# Patient Record
Sex: MALE | Race: BLACK OR AFRICAN AMERICAN | NOT HISPANIC OR LATINO | Employment: OTHER | URBAN - METROPOLITAN AREA
[De-identification: names, ages, dates, MRNs, and addresses within clinical notes are randomized per-mention and may not be internally consistent; named-entity substitution may affect disease eponyms.]

---

## 2017-09-06 ENCOUNTER — OFFICE VISIT (OUTPATIENT)
Dept: FAMILY MEDICINE | Facility: CLINIC | Age: 47
End: 2017-09-06
Payer: MEDICARE

## 2017-09-06 VITALS
WEIGHT: 315 LBS | OXYGEN SATURATION: 97 % | BODY MASS INDEX: 45.1 KG/M2 | SYSTOLIC BLOOD PRESSURE: 156 MMHG | HEART RATE: 84 BPM | HEIGHT: 70 IN | TEMPERATURE: 98 F | DIASTOLIC BLOOD PRESSURE: 90 MMHG

## 2017-09-06 DIAGNOSIS — I87.2 VENOUS INSUFFICIENCY: Primary | ICD-10-CM

## 2017-09-06 DIAGNOSIS — K21.9 GASTROESOPHAGEAL REFLUX DISEASE, ESOPHAGITIS PRESENCE NOT SPECIFIED: ICD-10-CM

## 2017-09-06 DIAGNOSIS — B35.1 ONYCHOMYCOSIS: ICD-10-CM

## 2017-09-06 DIAGNOSIS — E66.01 MORBID OBESITY WITH BMI OF 60.0-69.9, ADULT: ICD-10-CM

## 2017-09-06 PROCEDURE — 3077F SYST BP >= 140 MM HG: CPT | Mod: S$GLB,,, | Performed by: FAMILY MEDICINE

## 2017-09-06 PROCEDURE — 99999 PR PBB SHADOW E&M-EST. PATIENT-LVL III: CPT | Mod: PBBFAC,,, | Performed by: FAMILY MEDICINE

## 2017-09-06 PROCEDURE — 3008F BODY MASS INDEX DOCD: CPT | Mod: S$GLB,,, | Performed by: FAMILY MEDICINE

## 2017-09-06 PROCEDURE — 99214 OFFICE O/P EST MOD 30 MIN: CPT | Mod: S$GLB,,, | Performed by: FAMILY MEDICINE

## 2017-09-06 PROCEDURE — 99499 UNLISTED E&M SERVICE: CPT | Mod: S$GLB,,, | Performed by: FAMILY MEDICINE

## 2017-09-06 PROCEDURE — 3080F DIAST BP >= 90 MM HG: CPT | Mod: S$GLB,,, | Performed by: FAMILY MEDICINE

## 2017-09-06 RX ORDER — PHENOL/SODIUM PHENOLATE
1 AEROSOL, SPRAY (ML) MUCOUS MEMBRANE DAILY PRN
Qty: 30 EACH | Refills: 0 | Status: SHIPPED | OUTPATIENT
Start: 2017-09-06 | End: 2017-10-04 | Stop reason: SDUPTHER

## 2017-09-06 RX ORDER — FUROSEMIDE 20 MG/1
20 TABLET ORAL DAILY PRN
Qty: 30 TABLET | Refills: 3 | Status: SHIPPED | OUTPATIENT
Start: 2017-09-06 | End: 2017-10-04 | Stop reason: SDUPTHER

## 2017-09-06 NOTE — PROGRESS NOTES
"[facility]  Progress Note    SUBJECTIVE:     Chief Complaint   Patient presents with    Leg Swelling       HPI   Jose Manuel Jacome  is a 46 y.o. male here for c/o lower leg swelling with the R worst than L. No leg pain but standing/walking makes the swelling worst. Hasn't tried anything for it. Onset was "long time ago". No calf tenderness, able to ambulate without problems.    Review of patient's allergies indicates:   Allergen Reactions    Tramadol      Nausea, abdominal cramps    Allopurinol analogues Other (See Comments)     syncope       Past Medical History:   Diagnosis Date    Gastroesophageal reflux disease 9/6/2017    Gout     Morbid obesity     Sleep apnea      Past Surgical History:   Procedure Laterality Date    HIP FRACTURE SURGERY      left hip fracture    WRIST SURGERY      left wrist surgery     Family History   Problem Relation Age of Onset    Heart disease Mother     Cancer Father      Social History   Substance Use Topics    Smoking status: Current Every Day Smoker     Packs/day: 0.50     Years: 30.00     Types: Cigarettes    Smokeless tobacco: Never Used    Alcohol use No        Review of Systems   Constitutional: Negative for chills, fever and malaise/fatigue.   HENT: Negative.    Respiratory: Negative.  Negative for cough and shortness of breath.    Cardiovascular: Positive for leg swelling. Negative for chest pain.   Gastrointestinal: Positive for heartburn. Negative for abdominal pain, nausea and vomiting.   Genitourinary: Negative.    Neurological: Negative for weakness and headaches.   All other systems reviewed and are negative.    OBJECTIVE:     Vitals:    09/06/17 1045   BP: (!) 156/90   Pulse: 84   Temp: 98.2 °F (36.8 °C)     Body mass index is 63.2 kg/m².    Physical Exam   Constitutional: He is oriented to person, place, and time and well-developed, well-nourished, and in no distress. No distress.   HENT:   Head: Normocephalic and atraumatic.   Nose: Nose normal. "   Eyes: Conjunctivae and EOM are normal.   Cardiovascular: Normal rate, regular rhythm and normal heart sounds.  Exam reveals no gallop and no friction rub.    No murmur heard.  Pulmonary/Chest: Effort normal and breath sounds normal. No respiratory distress. He has no wheezes. He has no rales. He exhibits no tenderness.   Abdominal: Soft. Bowel sounds are normal. He exhibits no distension. There is no tenderness. There is no rebound.   Musculoskeletal: He exhibits edema (2+ pitting edema, with darkening skin color changes w/ dry skin of RLE. no ulcers, erythema, of evidence of infection).   Neurological: He is alert and oriented to person, place, and time.   Skin: Skin is warm. He is not diaphoretic.       Old records were reviewed. Labs and/or images were independently reviewed.    ASSESSMENT     1. Venous insufficiency    2. Morbid obesity with BMI of 60.0-69.9, adult    3. Gastroesophageal reflux disease, esophagitis presence not specified    4. Onychomycosis        PLAN:     Venous insufficiency  -     Start furosemide (LASIX) 20 MG tablet; Take 1 tablet (20 mg total) by mouth daily as needed.  Dispense: 30 tablet; Refill: 3  -     Recommend ICE, and elevation to decrease leg swelling.    Morbid obesity with BMI of 60.0-69.9, adult   -     Counseled patient about healthy diet, exercise habits, and to increase physical activity.   -     Will cut out hawaiian punch and all sugary drinks.    Gastroesophageal reflux disease, esophagitis presence not specified  -     Start omeprazole 20 mg TbEC; Take 1 tablet by mouth daily as needed.  Dispense: 30 each; Refill: 0    Onychomycosis   -    Will not treat at this time. Needs labs drawn first, and fix venous swelling first.     RTC PRN    Emerson Houser MD  09/06/2017 11:33 AM

## 2017-10-04 ENCOUNTER — OFFICE VISIT (OUTPATIENT)
Dept: FAMILY MEDICINE | Facility: CLINIC | Age: 47
End: 2017-10-04
Payer: MEDICARE

## 2017-10-04 VITALS
WEIGHT: 315 LBS | TEMPERATURE: 98 F | BODY MASS INDEX: 45.1 KG/M2 | OXYGEN SATURATION: 96 % | SYSTOLIC BLOOD PRESSURE: 140 MMHG | HEIGHT: 70 IN | DIASTOLIC BLOOD PRESSURE: 88 MMHG | HEART RATE: 78 BPM

## 2017-10-04 DIAGNOSIS — E66.01 MORBID OBESITY WITH BMI OF 60.0-69.9, ADULT: ICD-10-CM

## 2017-10-04 DIAGNOSIS — K21.9 GASTROESOPHAGEAL REFLUX DISEASE, ESOPHAGITIS PRESENCE NOT SPECIFIED: ICD-10-CM

## 2017-10-04 DIAGNOSIS — I87.2 VENOUS INSUFFICIENCY: Primary | ICD-10-CM

## 2017-10-04 DIAGNOSIS — Z23 PNEUMOCOCCAL VACCINATION ADMINISTERED AT CURRENT VISIT: ICD-10-CM

## 2017-10-04 PROCEDURE — 99499 UNLISTED E&M SERVICE: CPT | Mod: S$GLB,,, | Performed by: FAMILY MEDICINE

## 2017-10-04 PROCEDURE — 99999 PR PBB SHADOW E&M-EST. PATIENT-LVL III: CPT | Mod: PBBFAC,,, | Performed by: FAMILY MEDICINE

## 2017-10-04 PROCEDURE — 90732 PPSV23 VACC 2 YRS+ SUBQ/IM: CPT | Mod: S$GLB,,, | Performed by: FAMILY MEDICINE

## 2017-10-04 PROCEDURE — G0009 ADMIN PNEUMOCOCCAL VACCINE: HCPCS | Mod: S$GLB,,, | Performed by: FAMILY MEDICINE

## 2017-10-04 PROCEDURE — 99214 OFFICE O/P EST MOD 30 MIN: CPT | Mod: S$GLB,,, | Performed by: FAMILY MEDICINE

## 2017-10-04 RX ORDER — FUROSEMIDE 20 MG/1
20 TABLET ORAL DAILY
Qty: 90 TABLET | Refills: 3 | Status: SHIPPED | OUTPATIENT
Start: 2017-10-04 | End: 2019-03-22

## 2017-10-04 RX ORDER — PHENOL/SODIUM PHENOLATE
1 AEROSOL, SPRAY (ML) MUCOUS MEMBRANE DAILY PRN
Qty: 30 EACH | Refills: 3 | Status: SHIPPED | OUTPATIENT
Start: 2017-10-04 | End: 2017-12-24 | Stop reason: SDUPTHER

## 2017-10-04 RX ORDER — OMEPRAZOLE 20 MG/1
CAPSULE, DELAYED RELEASE ORAL DAILY PRN
Refills: 0 | COMMUNITY
Start: 2017-09-06 | End: 2017-10-04 | Stop reason: SDUPTHER

## 2017-10-04 NOTE — PROGRESS NOTES
[facility]  Progress Note    SUBJECTIVE:     Chief Complaint   Patient presents with    Gout    Leg Swelling       HPI   Jose Manuel Jacome  is a 46 y.o. male here for c/o leg swelling. His swelling did improve with lasix but he wouldn't take it every day, but when he did would take 2-3 at a time. The pain in his lower legs has improved. He admits still drinking a lot of coffee, with sugar. Lost 11 lbs since last office visit.    Review of patient's allergies indicates:   Allergen Reactions    Tramadol      Nausea, abdominal cramps    Allopurinol analogues Other (See Comments)     syncope       Past Medical History:   Diagnosis Date    Gastroesophageal reflux disease 9/6/2017    Gout     Morbid obesity     Sleep apnea      Past Surgical History:   Procedure Laterality Date    HIP FRACTURE SURGERY      left hip fracture    WRIST SURGERY      left wrist surgery     Family History   Problem Relation Age of Onset    Heart disease Mother     Cancer Father      Social History   Substance Use Topics    Smoking status: Current Every Day Smoker     Packs/day: 0.50     Years: 30.00     Types: Cigarettes    Smokeless tobacco: Never Used    Alcohol use No        Review of Systems   Constitutional: Negative for chills, fever and malaise/fatigue.   HENT: Negative.    Respiratory: Negative.  Negative for cough and shortness of breath.    Cardiovascular: Positive for leg swelling. Negative for chest pain.   Gastrointestinal: Positive for heartburn. Negative for abdominal pain, nausea and vomiting.   Genitourinary: Negative.    Neurological: Negative for weakness and headaches.   All other systems reviewed and are negative.    OBJECTIVE:     Vitals:    10/04/17 1049   BP: (!) 140/88   Pulse: 78   Temp: 98.3 °F (36.8 °C)     Body mass index is 61.59 kg/m².    Physical Exam   Constitutional: He is oriented to person, place, and time and well-developed, well-nourished, and in no distress. No distress.   HENT:    Head: Normocephalic and atraumatic.   Nose: Nose normal.   Eyes: Conjunctivae and EOM are normal.   Cardiovascular: Normal rate, regular rhythm and normal heart sounds.  Exam reveals no gallop and no friction rub.    No murmur heard.  Pulmonary/Chest: Effort normal and breath sounds normal. No respiratory distress. He has no wheezes. He has no rales. He exhibits no tenderness.   Abdominal: Soft. Bowel sounds are normal. He exhibits no distension. There is no tenderness. There is no rebound.   Musculoskeletal: He exhibits edema (2+ pitting edema, with darkening skin color changes w/ dry skin of RLE. no ulcers, erythema, of evidence of infection).   Neurological: He is alert and oriented to person, place, and time.   Skin: Skin is warm. He is not diaphoretic.       Old records were reviewed. Labs and/or images were independently reviewed.    ASSESSMENT     1. Venous insufficiency    2. Gastroesophageal reflux disease, esophagitis presence not specified    3. Pneumococcal vaccination administered at current visit    4. Morbid obesity with BMI of 60.0-69.9, adult        PLAN:     Venous insufficiency  -     furosemide (LASIX) 20 MG tablet; Take 1 tablet (20 mg total) by mouth once daily.  Dispense: 90 tablet; Refill: 3  -     Recommend to take lasix daily. Main goal is to decrease weight to improve lower leg swelling. Continue leg elevation.    Morbid Obesity   -     Counseled patient about healthy diet, exercise habits, and to increase physical activity.    -      Patient has to stop sugary drinks and coffee.     Gastroesophageal reflux disease, esophagitis presence not specified  -     omeprazole 20 mg TbEC; Take 1 tablet by mouth daily as needed.  Dispense: 30 each; Refill: 3    Pneumococcal vaccination administered at current visit  -     Pneumococcal Polysaccharide Vaccine (23 Valent) (SQ/IM)      RTC PRN  More than 50% of the encounter was spent counseling patient about morbid obesity, in an outpatient setting.  Total time spent counseling patient: 25.    Emerson Houser MD  10/04/2017 11:13 AM

## 2017-10-04 NOTE — PROGRESS NOTES
Pneumonia-23 vaccine administered, niyah well. Instructed to wait 15mins for observation, no reaction noted @ time of discharge.

## 2017-12-24 DIAGNOSIS — K21.9 GASTROESOPHAGEAL REFLUX DISEASE, ESOPHAGITIS PRESENCE NOT SPECIFIED: ICD-10-CM

## 2017-12-25 RX ORDER — OMEPRAZOLE 20 MG/1
CAPSULE, DELAYED RELEASE ORAL DAILY PRN
Qty: 30 CAPSULE | Refills: 6 | Status: SHIPPED | OUTPATIENT
Start: 2017-12-25 | End: 2018-07-20 | Stop reason: SDUPTHER

## 2018-07-20 DIAGNOSIS — K21.9 GASTROESOPHAGEAL REFLUX DISEASE, ESOPHAGITIS PRESENCE NOT SPECIFIED: ICD-10-CM

## 2018-07-20 RX ORDER — OMEPRAZOLE 20 MG/1
CAPSULE, DELAYED RELEASE ORAL DAILY PRN
Qty: 30 CAPSULE | Refills: 6 | Status: SHIPPED | OUTPATIENT
Start: 2018-07-20 | End: 2019-04-08 | Stop reason: SDUPTHER

## 2018-11-27 ENCOUNTER — TELEPHONE (OUTPATIENT)
Dept: SLEEP MEDICINE | Facility: CLINIC | Age: 48
End: 2018-11-27

## 2018-11-27 NOTE — TELEPHONE ENCOUNTER
----- Message from Selam Horner sent at 11/27/2018 11:26 AM CST -----  Contact: [facility] / fax# [phone] / # [phone]            Name of Who is Calling: [facility]      What is the request in detail:  Please fax over to [facility] the above patient's orders, medical necessity form, and also including clinical notes for new CPAP supplies.  Please do so at your earliest convenience.      THANKS!      Can the clinic reply by [facility]: No      Number to Call Back: [facility]    Fax# [phone]  Office# [phone]

## 2018-12-11 DIAGNOSIS — I87.2 VENOUS INSUFFICIENCY: ICD-10-CM

## 2018-12-11 RX ORDER — FUROSEMIDE 20 MG/1
20 TABLET ORAL DAILY
Qty: 90 TABLET | Refills: 3 | OUTPATIENT
Start: 2018-12-11 | End: 2019-12-11

## 2019-01-21 DIAGNOSIS — I87.2 VENOUS INSUFFICIENCY: ICD-10-CM

## 2019-01-21 RX ORDER — FUROSEMIDE 20 MG/1
20 TABLET ORAL DAILY
Qty: 90 TABLET | Refills: 3 | OUTPATIENT
Start: 2019-01-21 | End: 2020-01-21

## 2019-02-15 DIAGNOSIS — I87.2 VENOUS INSUFFICIENCY: ICD-10-CM

## 2019-02-15 RX ORDER — FUROSEMIDE 20 MG/1
20 TABLET ORAL DAILY
Qty: 90 TABLET | Refills: 3 | OUTPATIENT
Start: 2019-02-15 | End: 2020-02-15

## 2019-03-22 ENCOUNTER — HOSPITAL ENCOUNTER (EMERGENCY)
Facility: HOSPITAL | Age: 49
Discharge: ANOTHER HOSPITAL | End: 2019-03-22
Attending: EMERGENCY MEDICINE
Payer: MEDICARE

## 2019-03-22 VITALS
HEART RATE: 77 BPM | SYSTOLIC BLOOD PRESSURE: 193 MMHG | DIASTOLIC BLOOD PRESSURE: 84 MMHG | OXYGEN SATURATION: 96 % | BODY MASS INDEX: 45.1 KG/M2 | RESPIRATION RATE: 18 BRPM | TEMPERATURE: 100 F | WEIGHT: 315 LBS | HEIGHT: 70 IN

## 2019-03-22 DIAGNOSIS — K04.7 ACUTE PERIAPICAL ABSCESS: ICD-10-CM

## 2019-03-22 DIAGNOSIS — R22.0 RIGHT FACIAL SWELLING: Primary | ICD-10-CM

## 2019-03-22 LAB
ALBUMIN SERPL BCP-MCNC: 3.5 G/DL
ALP SERPL-CCNC: 85 U/L
ALT SERPL W/O P-5'-P-CCNC: 17 U/L
ANION GAP SERPL CALC-SCNC: 10 MMOL/L
AST SERPL-CCNC: 12 U/L
BASOPHILS # BLD AUTO: 0.05 K/UL
BASOPHILS NFR BLD: 0.5 %
BILIRUB SERPL-MCNC: 0.5 MG/DL
BUN SERPL-MCNC: 10 MG/DL
CALCIUM SERPL-MCNC: 8.9 MG/DL
CHLORIDE SERPL-SCNC: 105 MMOL/L
CO2 SERPL-SCNC: 23 MMOL/L
CREAT SERPL-MCNC: 0.9 MG/DL
DIFFERENTIAL METHOD: ABNORMAL
EOSINOPHIL # BLD AUTO: 0.1 K/UL
EOSINOPHIL NFR BLD: 1.5 %
ERYTHROCYTE [DISTWIDTH] IN BLOOD BY AUTOMATED COUNT: 15.7 %
EST. GFR  (AFRICAN AMERICAN): >60 ML/MIN/1.73 M^2
EST. GFR  (NON AFRICAN AMERICAN): >60 ML/MIN/1.73 M^2
GLUCOSE SERPL-MCNC: 101 MG/DL
HCT VFR BLD AUTO: 47.6 %
HGB BLD-MCNC: 15.7 G/DL
IMM GRANULOCYTES # BLD AUTO: 0.02 K/UL
IMM GRANULOCYTES NFR BLD AUTO: 0.2 %
LYMPHOCYTES # BLD AUTO: 2 K/UL
LYMPHOCYTES NFR BLD: 21.7 %
MCH RBC QN AUTO: 27.4 PG
MCHC RBC AUTO-ENTMCNC: 33 G/DL
MCV RBC AUTO: 83 FL
MONOCYTES # BLD AUTO: 1 K/UL
MONOCYTES NFR BLD: 10.2 %
NEUTROPHILS # BLD AUTO: 6.2 K/UL
NEUTROPHILS NFR BLD: 65.9 %
NRBC BLD-RTO: 0 /100 WBC
PLATELET # BLD AUTO: 341 K/UL
PMV BLD AUTO: 10.3 FL
POTASSIUM SERPL-SCNC: 3.9 MMOL/L
PROT SERPL-MCNC: 7.2 G/DL
RBC # BLD AUTO: 5.73 M/UL
SODIUM SERPL-SCNC: 138 MMOL/L
WBC # BLD AUTO: 9.33 K/UL

## 2019-03-22 PROCEDURE — 80053 COMPREHEN METABOLIC PANEL: CPT

## 2019-03-22 PROCEDURE — 99285 EMERGENCY DEPT VISIT HI MDM: CPT | Mod: 25

## 2019-03-22 PROCEDURE — S4991 NICOTINE PATCH NONLEGEND: HCPCS | Performed by: PHYSICIAN ASSISTANT

## 2019-03-22 PROCEDURE — 99284 EMERGENCY DEPT VISIT MOD MDM: CPT | Mod: ,,, | Performed by: EMERGENCY MEDICINE

## 2019-03-22 PROCEDURE — 96376 TX/PRO/DX INJ SAME DRUG ADON: CPT

## 2019-03-22 PROCEDURE — 96365 THER/PROPH/DIAG IV INF INIT: CPT | Mod: 59

## 2019-03-22 PROCEDURE — 25500020 PHARM REV CODE 255: Performed by: EMERGENCY MEDICINE

## 2019-03-22 PROCEDURE — 96361 HYDRATE IV INFUSION ADD-ON: CPT

## 2019-03-22 PROCEDURE — 99284 PR EMERGENCY DEPT VISIT,LEVEL IV: ICD-10-PCS | Mod: ,,, | Performed by: EMERGENCY MEDICINE

## 2019-03-22 PROCEDURE — S0077 INJECTION, CLINDAMYCIN PHOSP: HCPCS | Performed by: EMERGENCY MEDICINE

## 2019-03-22 PROCEDURE — 63600175 PHARM REV CODE 636 W HCPCS: Performed by: EMERGENCY MEDICINE

## 2019-03-22 PROCEDURE — 25000003 PHARM REV CODE 250: Performed by: PHYSICIAN ASSISTANT

## 2019-03-22 PROCEDURE — 96375 TX/PRO/DX INJ NEW DRUG ADDON: CPT

## 2019-03-22 PROCEDURE — 85025 COMPLETE CBC W/AUTO DIFF WBC: CPT

## 2019-03-22 PROCEDURE — 25000003 PHARM REV CODE 250: Performed by: EMERGENCY MEDICINE

## 2019-03-22 RX ORDER — MORPHINE SULFATE 4 MG/ML
4 INJECTION, SOLUTION INTRAMUSCULAR; INTRAVENOUS
Status: COMPLETED | OUTPATIENT
Start: 2019-03-22 | End: 2019-03-22

## 2019-03-22 RX ORDER — IBUPROFEN 200 MG
1 TABLET ORAL
Status: DISCONTINUED | OUTPATIENT
Start: 2019-03-22 | End: 2019-03-23 | Stop reason: HOSPADM

## 2019-03-22 RX ORDER — KETOROLAC TROMETHAMINE 30 MG/ML
15 INJECTION, SOLUTION INTRAMUSCULAR; INTRAVENOUS
Status: COMPLETED | OUTPATIENT
Start: 2019-03-22 | End: 2019-03-22

## 2019-03-22 RX ORDER — AMLODIPINE BESYLATE 10 MG/1
10 TABLET ORAL
Status: COMPLETED | OUTPATIENT
Start: 2019-03-22 | End: 2019-03-22

## 2019-03-22 RX ORDER — CLINDAMYCIN PHOSPHATE 600 MG/50ML
600 INJECTION, SOLUTION INTRAVENOUS
Status: COMPLETED | OUTPATIENT
Start: 2019-03-22 | End: 2019-03-22

## 2019-03-22 RX ADMIN — CLINDAMYCIN IN 5 PERCENT DEXTROSE 600 MG: 12 INJECTION, SOLUTION INTRAVENOUS at 06:03

## 2019-03-22 RX ADMIN — MORPHINE SULFATE 4 MG: 4 INJECTION, SOLUTION INTRAMUSCULAR; INTRAVENOUS at 10:03

## 2019-03-22 RX ADMIN — NICOTINE 1 PATCH: 21 PATCH, EXTENDED RELEASE TRANSDERMAL at 12:03

## 2019-03-22 RX ADMIN — KETOROLAC TROMETHAMINE 15 MG: 30 INJECTION, SOLUTION INTRAMUSCULAR at 10:03

## 2019-03-22 RX ADMIN — SODIUM CHLORIDE 1000 ML: 0.9 INJECTION, SOLUTION INTRAVENOUS at 05:03

## 2019-03-22 RX ADMIN — MORPHINE SULFATE 4 MG: 4 INJECTION, SOLUTION INTRAMUSCULAR; INTRAVENOUS at 05:03

## 2019-03-22 RX ADMIN — IOHEXOL 100 ML: 350 INJECTION, SOLUTION INTRAVENOUS at 09:03

## 2019-03-22 RX ADMIN — AMLODIPINE BESYLATE 10 MG: 10 TABLET ORAL at 05:03

## 2019-03-22 NOTE — ED NOTES
Jose Manuel Jacome, an 48 y.o. male presents to the ED c/o right facial swelling and right upper lip swelling- pt has known broken tooth to right upper mouth that he reports never getting taken care of or being on abx for.  Denies sob, throat swelling, - airway clear and patent - pt speaking in clear sentences.       Chief Complaint   Patient presents with    Facial Swelling     Pt to ER c/o right facial swelling with pain that he noticed this morning. He c/o upper frontal tooth pain yesterday.     Review of patient's allergies indicates:   Allergen Reactions    Tramadol      Nausea, abdominal cramps    Allopurinol analogues Other (See Comments)     syncope     Past Medical History:   Diagnosis Date    Gastroesophageal reflux disease 9/6/2017    Gout     Morbid obesity     Sleep apnea

## 2019-03-22 NOTE — ED PROVIDER NOTES
Encounter Date: 3/22/2019    SCRIBE #1 NOTE: I, Deanne Rand, am scribing for, and in the presence of,  Dr. Lopez. I have scribed the entire note.       History     Chief Complaint   Patient presents with    Facial Swelling     Pt to ER c/o right facial swelling with pain that he noticed this morning. He c/o upper frontal tooth pain yesterday.     The patient is a 48 y.o. male with co-morbidities including: gout, morbid obesity, and gastroesophageal reflux disease, who presents to the ED with a complaint of facial swelling and pain. Patient states that he was fine when he went to sleep but when he woke up his face was swollen. Patient noted dental pain yesterday. He mentions that he sleeps with a CPAP machine due to snoring. Of note, patient was on an anti-hypertensive but he ran out.       The history is provided by the patient.     Review of patient's allergies indicates:   Allergen Reactions    Tramadol      Nausea, abdominal cramps    Allopurinol analogues Other (See Comments)     syncope     Past Medical History:   Diagnosis Date    Gastroesophageal reflux disease 9/6/2017    Gout     Morbid obesity     Sleep apnea      Past Surgical History:   Procedure Laterality Date    HIP FRACTURE SURGERY      left hip fracture    WRIST SURGERY      left wrist surgery     Family History   Problem Relation Age of Onset    Heart disease Mother     Cancer Father      Social History     Tobacco Use    Smoking status: Current Every Day Smoker     Packs/day: 0.50     Years: 30.00     Pack years: 15.00     Types: Cigarettes    Smokeless tobacco: Never Used   Substance Use Topics    Alcohol use: No    Drug use: No     Review of Systems   Constitutional: Negative for fever.   HENT: Positive for facial swelling. Negative for sore throat.         Dental pain.   Eyes: Negative for visual disturbance.   Respiratory: Negative for shortness of breath.    Cardiovascular: Negative for chest pain.   Gastrointestinal:  Negative for nausea.   Genitourinary: Negative for dysuria.   Musculoskeletal: Negative for back pain.   Skin: Negative for rash.   Neurological: Negative for weakness.       Physical Exam     Initial Vitals [03/22/19 0443]   BP Pulse Resp Temp SpO2   (!) 207/115 76 16 99.5 °F (37.5 °C) 96 %      MAP       --         Physical Exam    Nursing note and vitals reviewed.  Constitutional: He appears well-developed and well-nourished. No distress.   Febrile. Obese.   HENT:   Head: Normocephalic and atraumatic.   Significant right facial swelling under right eye. Tender to palpation. Multiple broken carious teeth, tender all along upper gumline.    Eyes: Conjunctivae are normal.   Neck: Normal range of motion.   Cardiovascular: Normal rate, regular rhythm and normal heart sounds.   Patient is hypertensive.   Pulmonary/Chest: Breath sounds normal. No respiratory distress.   Abdominal: Soft. He exhibits no distension. There is no tenderness.   Musculoskeletal: Normal range of motion.   Neurological: He is alert and oriented to person, place, and time.   Skin: Skin is warm and dry. No erythema.         ED Course   Procedures  Labs Reviewed   CBC W/ AUTO DIFFERENTIAL - Abnormal; Notable for the following components:       Result Value    RDW 15.7 (*)     All other components within normal limits   COMPREHENSIVE METABOLIC PANEL          Imaging Results          CT Maxillofacial With Contrast (Final result)  Result time 03/22/19 09:53:51    Final result by Julio Martell DO (03/22/19 09:53:51)                 Impression:      Diffuse soft tissue swelling induration right inferior pre maxillary soft tissues extending across midline through the philtrum.    Periapical cysts within the maxillary dentition with small dental abscesses associated with right maxillary canine tooth and left maxillary incisor teeth as detailed above.    Please note there is partially visualized periapical cyst associated with the right maxillary molar  teeth however evaluation for this is limited by incomplete inclusion of the field of view secondary to technical error.  Clinical correlation and further evaluation with repeat imaging as warranted    Incidental remote fracture deformities of the lamina papyracea bilaterally.    There is partial opacification of the right mastoid air cells.      Electronically signed by: Julio Martell DO  Date:    03/22/2019  Time:    09:53             Narrative:    EXAMINATION:  CT MAXILLOFACIAL WITH CONTRAST    CLINICAL HISTORY:  Pain, maxface;facial swelling;    TECHNIQUE:  Multiple sequential 2.5 mm axial images of the maxillofacial bones with contrast.  Coronal and sagittal reformatted imaging from the axial acquisition.  100 mL of Omnipaque 350 contrast was injected intravenously.    COMPARISON:  None    FINDINGS:  There is prominent soft tissue swelling along the right pre maxillary soft tissues with extending across midline involving the philtrum.  There are several periapical cyst associated with the maxillary dentition.  The largest of which involving the right canine tooth with superimposed hypodense collection in the pre maxillary soft tissues compatible with developing abscess this measures 1 point 5 cm by 0 point 5 cm in greatest transverse dimensions image 21 series 12.    In addition there is a smaller hypodense collection associated with the left maxillary 2nd incisor tooth measuring approximately 3-4 mm concerning for additional abscess.    Please note there innumerable dental caries throughout the maxillary dentition clinical correlation and correlation with dental examination recommended.    Please note due to technical air the entirety of the mandible is not included in the field of view with prominent periapical cyst associated with the right maxillary 1st molar tooth.                                 Medical Decision Making:   History:   Old Medical Records: I decided to obtain old medical records.  Old Records  Summarized: other records.       <> Summary of Records: Records summarized in HPI.  Clinical Tests:   Lab Tests: Ordered and Reviewed  Radiological Study: Ordered and Reviewed  ED Management:  Emergent facial swelling concerning for odontogenic abcess. No airway compromises. Patient hypertensive. Check labs and  CT imaging. Start antibiotics.             Scribe Attestation:   Scribe #1: I performed the above scribed service and the documentation accurately describes the services I performed. I attest to the accuracy of the note.    Attending Attestation:             Attending ED Notes:   Disposition pending CT imaging and ED workup., turned over to Dr. Serrano.              Clinical Impression:       ICD-10-CM ICD-9-CM   1. Right facial swelling R22.0 784.2   2. Acute periapical abscess K04.7 522.5                                Michoacano Lopez MD  03/23/19 [id number]

## 2019-04-08 DIAGNOSIS — K21.9 GASTROESOPHAGEAL REFLUX DISEASE, ESOPHAGITIS PRESENCE NOT SPECIFIED: ICD-10-CM

## 2019-04-08 RX ORDER — OMEPRAZOLE 20 MG/1
CAPSULE, DELAYED RELEASE ORAL DAILY PRN
Qty: 30 CAPSULE | Refills: 6 | Status: SHIPPED | OUTPATIENT
Start: 2019-04-08 | End: 2020-10-16 | Stop reason: SDUPTHER

## 2020-04-15 ENCOUNTER — TELEPHONE (OUTPATIENT)
Dept: SLEEP MEDICINE | Facility: CLINIC | Age: 50
End: 2020-04-15

## 2020-04-15 NOTE — TELEPHONE ENCOUNTER
----- Message from Tran Ojeda sent at 4/15/2020 11:51 AM CDT -----  Contact: Yareli S with [employer]  Type: Patient Call Back    Who called: Yareli NEUMANN with [employer]    What is the request in detail: Yareli NEUMANN with [employer] is requesting a call back. She states that she need the most updated clinical notes faxed over to [phone] for the patients CPAP supplies order.   Please advise.    Can the clinic reply by [facility]? No    Best call back number: [phone]    Additional Information: N/A

## 2020-04-29 ENCOUNTER — HOSPITAL ENCOUNTER (EMERGENCY)
Facility: HOSPITAL | Age: 50
Discharge: HOME | End: 2020-04-29
Attending: EMERGENCY MEDICINE
Payer: MEDICARE

## 2020-04-29 VITALS
HEART RATE: 75 BPM | OXYGEN SATURATION: 98 % | DIASTOLIC BLOOD PRESSURE: 83 MMHG | BODY MASS INDEX: 17.97 KG/M2 | TEMPERATURE: 99 F | WEIGHT: 125.25 LBS | RESPIRATION RATE: 18 BRPM | SYSTOLIC BLOOD PRESSURE: 167 MMHG

## 2020-04-29 DIAGNOSIS — K08.89 PAIN, DENTAL: Primary | ICD-10-CM

## 2020-04-29 DIAGNOSIS — K02.9 DENTAL CARIES: ICD-10-CM

## 2020-04-29 PROCEDURE — 99284 EMERGENCY DEPT VISIT MOD MDM: CPT

## 2020-04-29 PROCEDURE — 25000003 PHARM REV CODE 250: Performed by: PHYSICIAN ASSISTANT

## 2020-04-29 PROCEDURE — 99284 PR EMERGENCY DEPT VISIT,LEVEL IV: ICD-10-PCS | Mod: ,,, | Performed by: PHYSICIAN ASSISTANT

## 2020-04-29 PROCEDURE — 99284 EMERGENCY DEPT VISIT MOD MDM: CPT | Mod: ,,, | Performed by: PHYSICIAN ASSISTANT

## 2020-04-29 RX ORDER — PENICILLIN V POTASSIUM 500 MG/1
500 TABLET, FILM COATED ORAL EVERY 6 HOURS
Qty: 28 TABLET | Refills: 0 | Status: SHIPPED | OUTPATIENT
Start: 2020-04-29 | End: 2020-05-06

## 2020-04-29 RX ORDER — IBUPROFEN 600 MG/1
600 TABLET ORAL
Status: COMPLETED | OUTPATIENT
Start: 2020-04-29 | End: 2020-04-29

## 2020-04-29 RX ORDER — NAPROXEN 500 MG/1
500 TABLET ORAL 2 TIMES DAILY WITH MEALS
Qty: 10 TABLET | Refills: 0 | Status: SHIPPED | OUTPATIENT
Start: 2020-04-29 | End: 2020-10-16

## 2020-04-29 RX ADMIN — IBUPROFEN 600 MG: 600 TABLET, FILM COATED ORAL at 09:04

## 2020-04-30 ENCOUNTER — HOSPITAL ENCOUNTER (EMERGENCY)
Facility: HOSPITAL | Age: 50
Discharge: HOME | End: 2020-04-30
Attending: EMERGENCY MEDICINE
Payer: MEDICARE

## 2020-04-30 VITALS
TEMPERATURE: 99 F | SYSTOLIC BLOOD PRESSURE: 157 MMHG | HEART RATE: 69 BPM | OXYGEN SATURATION: 98 % | DIASTOLIC BLOOD PRESSURE: 79 MMHG | RESPIRATION RATE: 18 BRPM

## 2020-04-30 DIAGNOSIS — K02.9 PAIN DUE TO DENTAL CARIES: ICD-10-CM

## 2020-04-30 DIAGNOSIS — R22.0 FACIAL SWELLING: ICD-10-CM

## 2020-04-30 DIAGNOSIS — K04.7 DENTAL INFECTION: Primary | ICD-10-CM

## 2020-04-30 LAB
ALBUMIN SERPL BCP-MCNC: 3.7 G/DL (ref 3.5–5.2)
ALP SERPL-CCNC: 75 U/L (ref 55–135)
ALT SERPL W/O P-5'-P-CCNC: 19 U/L (ref 10–44)
ANION GAP SERPL CALC-SCNC: 8 MMOL/L (ref 8–16)
AST SERPL-CCNC: 20 U/L (ref 10–40)
BASOPHILS # BLD AUTO: 0.04 K/UL (ref 0–0.2)
BASOPHILS NFR BLD: 0.4 % (ref 0–1.9)
BILIRUB SERPL-MCNC: 0.5 MG/DL (ref 0.1–1)
BUN SERPL-MCNC: 10 MG/DL (ref 6–20)
CALCIUM SERPL-MCNC: 8.8 MG/DL (ref 8.7–10.5)
CHLORIDE SERPL-SCNC: 105 MMOL/L (ref 95–110)
CO2 SERPL-SCNC: 23 MMOL/L (ref 23–29)
CREAT SERPL-MCNC: 1 MG/DL (ref 0.5–1.4)
DIFFERENTIAL METHOD: NORMAL
EOSINOPHIL # BLD AUTO: 0.2 K/UL (ref 0–0.5)
EOSINOPHIL NFR BLD: 2 % (ref 0–8)
ERYTHROCYTE [DISTWIDTH] IN BLOOD BY AUTOMATED COUNT: 14.2 % (ref 11.5–14.5)
EST. GFR  (AFRICAN AMERICAN): >60 ML/MIN/1.73 M^2
EST. GFR  (NON AFRICAN AMERICAN): >60 ML/MIN/1.73 M^2
GLUCOSE SERPL-MCNC: 99 MG/DL (ref 70–110)
HCT VFR BLD AUTO: 46.4 % (ref 40–54)
HGB BLD-MCNC: 14.9 G/DL (ref 14–18)
IMM GRANULOCYTES # BLD AUTO: 0.03 K/UL (ref 0–0.04)
IMM GRANULOCYTES NFR BLD AUTO: 0.3 % (ref 0–0.5)
LYMPHOCYTES # BLD AUTO: 2.2 K/UL (ref 1–4.8)
LYMPHOCYTES NFR BLD: 23.4 % (ref 18–48)
MCH RBC QN AUTO: 27.7 PG (ref 27–31)
MCHC RBC AUTO-ENTMCNC: 32.1 G/DL (ref 32–36)
MCV RBC AUTO: 86 FL (ref 82–98)
MONOCYTES # BLD AUTO: 0.8 K/UL (ref 0.3–1)
MONOCYTES NFR BLD: 8.7 % (ref 4–15)
NEUTROPHILS # BLD AUTO: 6.1 K/UL (ref 1.8–7.7)
NEUTROPHILS NFR BLD: 65.2 % (ref 38–73)
NRBC BLD-RTO: 0 /100 WBC
PLATELET # BLD AUTO: 281 K/UL (ref 150–350)
PMV BLD AUTO: 9.7 FL (ref 9.2–12.9)
POTASSIUM SERPL-SCNC: 4.7 MMOL/L (ref 3.5–5.1)
PROT SERPL-MCNC: 8 G/DL (ref 6–8.4)
RBC # BLD AUTO: 5.38 M/UL (ref 4.6–6.2)
SODIUM SERPL-SCNC: 136 MMOL/L (ref 136–145)
WBC # BLD AUTO: 9.42 K/UL (ref 3.9–12.7)

## 2020-04-30 PROCEDURE — 63600175 PHARM REV CODE 636 W HCPCS: Performed by: STUDENT IN AN ORGANIZED HEALTH CARE EDUCATION/TRAINING PROGRAM

## 2020-04-30 PROCEDURE — 25000003 PHARM REV CODE 250: Performed by: EMERGENCY MEDICINE

## 2020-04-30 PROCEDURE — 99285 EMERGENCY DEPT VISIT HI MDM: CPT | Mod: 25

## 2020-04-30 PROCEDURE — 99284 EMERGENCY DEPT VISIT MOD MDM: CPT | Mod: ,,, | Performed by: EMERGENCY MEDICINE

## 2020-04-30 PROCEDURE — 80053 COMPREHEN METABOLIC PANEL: CPT

## 2020-04-30 PROCEDURE — 25500020 PHARM REV CODE 255: Performed by: EMERGENCY MEDICINE

## 2020-04-30 PROCEDURE — 25000003 PHARM REV CODE 250: Performed by: STUDENT IN AN ORGANIZED HEALTH CARE EDUCATION/TRAINING PROGRAM

## 2020-04-30 PROCEDURE — 96375 TX/PRO/DX INJ NEW DRUG ADDON: CPT | Mod: 59

## 2020-04-30 PROCEDURE — 85025 COMPLETE CBC W/AUTO DIFF WBC: CPT

## 2020-04-30 PROCEDURE — 99284 PR EMERGENCY DEPT VISIT,LEVEL IV: ICD-10-PCS | Mod: ,,, | Performed by: EMERGENCY MEDICINE

## 2020-04-30 PROCEDURE — 96365 THER/PROPH/DIAG IV INF INIT: CPT | Mod: 59

## 2020-04-30 RX ORDER — CLINDAMYCIN PHOSPHATE 900 MG/50ML
900 INJECTION, SOLUTION INTRAVENOUS
Status: COMPLETED | OUTPATIENT
Start: 2020-04-30 | End: 2020-04-30

## 2020-04-30 RX ORDER — AMOXICILLIN AND CLAVULANATE POTASSIUM 875; 125 MG/1; MG/1
1 TABLET, FILM COATED ORAL
Status: COMPLETED | OUTPATIENT
Start: 2020-04-30 | End: 2020-04-30

## 2020-04-30 RX ORDER — KETOROLAC TROMETHAMINE 30 MG/ML
15 INJECTION, SOLUTION INTRAMUSCULAR; INTRAVENOUS
Status: COMPLETED | OUTPATIENT
Start: 2020-04-30 | End: 2020-04-30

## 2020-04-30 RX ADMIN — KETOROLAC TROMETHAMINE 15 MG: 30 INJECTION, SOLUTION INTRAMUSCULAR at 04:04

## 2020-04-30 RX ADMIN — CLINDAMYCIN IN 5 PERCENT DEXTROSE 900 MG: 18 INJECTION, SOLUTION INTRAVENOUS at 07:04

## 2020-04-30 RX ADMIN — IOHEXOL 75 ML: 350 INJECTION, SOLUTION INTRAVENOUS at 05:04

## 2020-04-30 RX ADMIN — AMOXICILLIN AND CLAVULANATE POTASSIUM 1 TABLET: 875; 125 TABLET, FILM COATED ORAL at 04:04

## 2020-04-30 NOTE — ED NOTES
Patient identifiers verified and correct for Jose Manuel Jacome.    LOC: The patient is awake, alert and aware of environment with an appropriate affect, the patient is oriented x 3 and speaking appropriately.   APPEARANCE: Patient resting comfortably and in no acute distress, patient is clean and well groomed, patient's clothing is properly fastened. Sitting up in bedside chair with continuous O2 and BP monitor  SKIN: The skin is warm and dry, color consistent with ethnicity, patient has normal skin turgor and moist mucus membranes, skin intact, no breakdown or bruising noted. Swelling to left side of face, endorses numbness in lips and tender to touch.   MUSCULOSKELETAL: Patient moving all extremities spontaneously, no obvious swelling or deformities noted. Assisted to restroom, stable gait.  RESPIRATORY: Airway is open and patent, respirations are spontaneous, patient has a normal effort and rate, no accessory muscle use noted. O2 sat 98% RA  CARDIAC: Patient has a normal rate, no periphreal edema noted, capillary refill < 3 seconds.  ABDOMEN: Soft and non tender to palpation, no distention noted.  NEUROLOGIC: PERRL, 3 mm bilaterally, eyes open spontaneously, behavior appropriate to situation, follows commands, facial expression symmetrical, bilateral hand grasp equal and even, purposeful motor response noted, normal sensation in all extremities when touched with a finger.     Denies needs at this time.

## 2020-04-30 NOTE — PROVIDER PROGRESS NOTES - EMERGENCY DEPT.
Encounter Date: 4/30/2020    ED Physician Progress Notes        Physician Note:   Assumed care of patient from my colleague Dr. Praveen Church.  Briefly this is a 49-year-old man who presents with facial swelling over the left upper dentition.  He was seen here yesterday evening complaining of left upper teeth pain and mild left upper lip swelling.  He was discharged home with penicillin.  He fell asleep last night and awoke in the middle of the night with significant swelling of his left cheek.  He return to the emergency department.  The time of sign-out he was awaiting a CT scan of the face to rule out facial abscess.  CT scan shows a 2.4 x .8 cm fluid collection at the philtrum just left of midline above the region the left premolar consistent with an abscess.  Have ordered 900 mg clindamycin IV and have discussed with the transfer center to arrange for transfer to [facility] for OMFS services.  Patient is in agreement.  He is able to go by private vehicle and patient has requested this.

## 2020-04-30 NOTE — ED TRIAGE NOTES
Jose Manuel Jacome, an 49 y.o. male presents to the ED c/o left side facial swelling starting a few hours ago. Protecting his own airway. Denies fever.      Chief Complaint   Patient presents with    Oral Swelling     Patient was discharged a few hours ago and developed swelling left cheek swelling where toothache is located. Pt states that he fell asleep when he got home and swelling started spontanously. Denies any swallow difficulty or airway compromise. Unable to fill prescription.     Review of patient's allergies indicates:   Allergen Reactions    Tramadol      Nausea, abdominal cramps    Allopurinol analogues Other (See Comments)     syncope     Past Medical History:   Diagnosis Date    Gastroesophageal reflux disease 9/6/2017    Gout     Morbid obesity     Sleep apnea        LOC: The patient is awake, alert and aware of environment with an appropriate affect, the patient is oriented x 3 and speaking appropriately.   APPEARANCE: Patient appears comfortable and in no acute distress, patient is clean and well groomed.  SKIN: The skin is warm and dry, color consistent with ethnicity, patient has normal skin turgor and moist mucus membranes, skin intact, no breakdown or bruising noted.   MUSCULOSKELETAL: Patient moving all extremities spontaneously, BLE and facial swelling noted.  RESPIRATORY: Airway is open and patent, respirations are spontaneous, patient has a normal effort and rate, no accessory muscle use noted. -SOB, -cough.  CARDIAC: Patient has a normal rate and regular rhythm, no edema noted, capillary refill < 3 seconds.   GASTRO: Soft and non tender to palpation, no distention noted. -NVD.  : Pt denies any pain or frequency with urination.  NEURO: Pt opens eyes spontaneously, behavior appropriate to situation, follows commands, facial expression symmetrical, bilateral hand grasp equal and even, purposeful motor response noted, normal sensation in all extremities when touched with a finger.

## 2020-04-30 NOTE — DISCHARGE INSTRUCTIONS
DENTAL RESOURCES      [facility]       [phone]     [facility] 8-4 Monday - Friday       [phone]     [facility]       [phone]     [facility]                                 0-6 years                                                                                             7-13 years     [phone]     [facility]    [facility]     [phone]     [facility]       [phone]     [facility]    [address], Mon-Fri not on Wed  8am-4pm    Over 60 years old living in [place]           [phone]     [facility]    [address] [place]     [phone]     [facility] [place]       [phone]     [facility] HIV [facility]  [address]       [phone]     [facility]       [phone]

## 2020-04-30 NOTE — ED PROVIDER NOTES
Encounter Date: 4/30/2020       History     Chief Complaint   Patient presents with    Oral Swelling     Patient was discharged a few hours ago and developed swelling left cheek swelling where toothache is located. Pt states that he fell asleep when he got home and swelling started spontanously. Denies any swallow difficulty or airway compromise. Unable to fill prescription.     HPI   49 yom with pmh/o GERD, morbid obesity, OLU presenting with dental pain and facial swelling. Patient was discharged from the ED approximately four hours ago after being evaluated for acute onset maxillary swelling and pain. Patient was evaluated and found to have odontogenic infection with no appreciable areas suitable for drainage. Discharged from ED with naproxen, course of PCN V, instructions to f/u with a dental clinic w/in two days. Patient reports that he awoke from sleep approx one hour ago in significant pain- noticed that the area of swelling over his face had increased and tracked proximally. Denies subjective fevers/chills, N/V/D, SOB, headaches, visual changes, pain with eye movement, dysphagia, respiratory distress. Of note, patient had full tooth extraction approximately one year ago, c/b two cracked teeth that were unable to be removed. Patient has been unable to obtain follow-up for evaluation, removal of remaining two damaged teeth since.       Review of patient's allergies indicates:   Allergen Reactions    Tramadol      Nausea, abdominal cramps    Allopurinol analogues Other (See Comments)     syncope     Past Medical History:   Diagnosis Date    Gastroesophageal reflux disease 9/6/2017    Gout     Morbid obesity     Sleep apnea      Past Surgical History:   Procedure Laterality Date    HIP FRACTURE SURGERY      left hip fracture    WRIST SURGERY      left wrist surgery     Family History   Problem Relation Age of Onset    Heart disease Mother     Cancer Father      Social History     Tobacco Use     Smoking status: Current Every Day Smoker     Packs/day: 0.50     Years: 30.00     Pack years: 15.00     Types: Cigarettes    Smokeless tobacco: Never Used   Substance Use Topics    Alcohol use: No    Drug use: No     Review of Systems   Constitutional: Negative for fever.   HENT: Positive for dental problem and facial swelling. Negative for sore throat.    Respiratory: Negative for shortness of breath.    Cardiovascular: Negative for chest pain.   Gastrointestinal: Negative for nausea.   Genitourinary: Negative for dysuria.   Musculoskeletal: Negative for back pain.   Skin: Negative for rash.   Neurological: Negative for weakness.   Hematological: Does not bruise/bleed easily.       Physical Exam     Initial Vitals [04/30/20 0301]   BP Pulse Resp Temp SpO2   (!) 167/80 76 20 99.2 °F (37.3 °C) 95 %      MAP       --         Physical Exam    Nursing note and vitals reviewed.  Constitutional: He is not diaphoretic. No distress.   Obese male sitting in bed. Non-ill appearing, but in some obvious discomfort   HENT:   Head: Normocephalic.   Right Ear: External ear normal.   Left Ear: External ear normal.   Left sided facial swelling involving lip, left nasal fold, medial periorbital area. Patient does not have any teeth. Noticeable davide at root at tooth socket 10/11, no drainage. No appreciable areas of fluctuance or drainage.   Eyes: Conjunctivae and EOM are normal. Pupils are equal, round, and reactive to light.   No pain with extraocular eye movements. No appreciable proptosis, drainage bilaterally   Neck: Neck supple.   Cardiovascular: Normal rate, regular rhythm and intact distal pulses.   Pulmonary/Chest:   Normal work of breathing, no appreciable respiratory distress. No appreciable stridor.   Abdominal: Soft. He exhibits no distension.   Musculoskeletal: Normal range of motion.   Neurological: He is alert and oriented to person, place, and time. GCS score is 15. GCS eye subscore is 4. GCS verbal subscore is  5. GCS motor subscore is 6.   No focal neurological deficits. CN 2-12 appear grossly intact. Moves all extremities spontaneously   Skin: Skin is warm and dry. Capillary refill takes less than 2 seconds. No rash noted.   Psychiatric: He has a normal mood and affect. His behavior is normal. Thought content normal.         ED Course   Procedures  Labs Reviewed - No data to display       Imaging Results    None          Medical Decision Making:   History:   Old Medical Records: I decided to obtain old medical records.  Old Records Summarized: other records.       <> Summary of Records: Patient seen in ED earlier this evening for similar complaint- discharged with rx for naproxen, penicllin, instructions to f/u dental clinic. S/p extraction of all teeth approximately one year ago.  Clinical Tests:   Lab Tests: Ordered and Reviewed  Radiological Study: Ordered and Reviewed       APC / Resident Notes:   This is an emergent evaluation of a 49 yom presenting with worsening facial swelling and dental pain. On presentation, mildly hypertensive, vitals otherwise stable. Physical exam significant for left-sided facial swelling tracking from left lip to distal-medial periorbital area. Extraocular eye movements intact w/o pain, no proptosis. No appreciable areas of fluctuance or drainage on oropharynx examination. Patient has noticeable davide at socket of tooth 11/12. Concern for on-going infection tracking superficially from likely odontogenic source. Given apparent rapid progression, will obtain basic labs, noncon CT max-face for evaluation. Patient given toradol, augmentin for initial management. Will CTM closely. Final disposition pending clinical course, work-up.    UPDATE:  Patient currently pending CT max-face, CMP results. CBC unremarkable. Has remained clinically stable during work-up. Resting comfortably in bed. Care of patient signed out to Dr. Church.                            Clinical Impression:       ICD-10-CM  ICD-9-CM   1. Dental infection K04.7 522.4   2. Facial swelling R22.0 784.2   3. Pain due to dental caries K02.9 521.00                                Eliezer Smith MD  Resident  04/30/20 0448

## 2020-04-30 NOTE — ED PROVIDER NOTES
Encounter Date: 4/29/2020       History     Chief Complaint   Patient presents with    Dental Pain     Toothache since yesterday     Patient is a 49-year-old male with past medical history of GERD, morbid obesity, sleep apnea, hypertension who presents the emergency department with complaints of dental pain and facial swelling that began last night.  Patient reports he has had all of his teeth removed about a year ago.  He had a complication in which one tooth broke during the extraction.  He was scheduled to have a follow-up surgery several months ago however has had difficulty with follow-up.  He reports taking Tylenol and 2 doses of amoxicillin at home.  He reports minor improvement in pain.  He reports pain is worse with palpation of the area.  Denies other worsening or alleviating factors.  He denies fever, chill, chest pain, shortness of breath, cough, nausea, vomiting, diarrhea.  This is the extent of the patient's complaints.        Review of patient's allergies indicates:   Allergen Reactions    Tramadol      Nausea, abdominal cramps    Allopurinol analogues Other (See Comments)     syncope     Past Medical History:   Diagnosis Date    Gastroesophageal reflux disease 9/6/2017    Gout     Morbid obesity     Sleep apnea      Past Surgical History:   Procedure Laterality Date    HIP FRACTURE SURGERY      left hip fracture    WRIST SURGERY      left wrist surgery     Family History   Problem Relation Age of Onset    Heart disease Mother     Cancer Father      Social History     Tobacco Use    Smoking status: Current Every Day Smoker     Packs/day: 0.50     Years: 30.00     Pack years: 15.00     Types: Cigarettes    Smokeless tobacco: Never Used   Substance Use Topics    Alcohol use: No    Drug use: No     Review of Systems   Constitutional: Negative for chills and fever.   HENT: Positive for dental problem and facial swelling.    Eyes: Negative for pain.   Respiratory: Negative for cough and  shortness of breath.    Cardiovascular: Negative for chest pain.   Gastrointestinal: Negative for abdominal pain, diarrhea, nausea and vomiting.   Genitourinary: Negative for dysuria.   Musculoskeletal: Negative for back pain.   Skin: Negative for rash.   Neurological: Negative for headaches.       Physical Exam     Initial Vitals [04/29/20 2052]   BP Pulse Resp Temp SpO2   (!) 182/90 85 20 99.9 °F (37.7 °C) 95 %      MAP       --         Physical Exam    Nursing note and vitals reviewed.  Constitutional: He appears well-developed and well-nourished. He is not diaphoretic. No distress.   Morbidly obese  male in no apparent distress   HENT:   Head: Normocephalic and atraumatic.   Mouth/Throat: Uvula is midline. Abnormal dentition. Dental caries present. No dental abscesses.   Slight swelling to the superior aspect of the lip and left nasal labial fold.  No evidence of erythema, cellulitis, fluctuance externally.  Patient has no visible teeth however does have area that appears to be a dental cori.  Pulp is exposed.  There is no area of fluctuance to suggest dental abscess.   Eyes: Conjunctivae and EOM are normal. Pupils are equal, round, and reactive to light.   Neck: Normal range of motion. Neck supple.   Cardiovascular: Normal rate, regular rhythm, normal heart sounds and intact distal pulses. Exam reveals no gallop and no friction rub.    No murmur heard.  Pulmonary/Chest: Breath sounds normal. He has no wheezes. He has no rhonchi. He has no rales.   Abdominal: Soft. Bowel sounds are normal. There is no tenderness.   Musculoskeletal: Normal range of motion.   Neurological: He is alert and oriented to person, place, and time. He has normal strength. GCS score is 15. GCS eye subscore is 4. GCS verbal subscore is 5. GCS motor subscore is 6.   Skin: Skin is warm and dry. Capillary refill takes less than 2 seconds.   Psychiatric: He has a normal mood and affect. His behavior is normal. Judgment and  thought content normal.         ED Course   Procedures  Labs Reviewed - No data to display          Medical Decision Making:   History:   Old Medical Records: I decided to obtain old medical records.  Initial Assessment:   Emergent evaluation of a 49-year-old male who presents the emergency department with complaints of dental pain and facial swelling.  He has tried Tylenol and 2 doses of amoxicillin at home with minimal relief.  Patient is afebrile, hemodynamically stable, and nontoxic appearing.  Differential Diagnosis:   Differential diagnosis includes but is not limited to cellulitis, dental abscess, dental caries.  ED Management:  Patient presentation is most consistent with infection.  There is no evidence of dental abscess that required drainage.  He is given a dose of ibuprofen in the emergency department.  I will give him a prescription for penicillin and naproxen as needed for pain.  He is given a list of dental resources and instructed to follow up with the 1st available appointment.  He voices understanding.  All questions answered.  The patient was instructed to follow up with a dental clinic in 2 days or to return to the emergency department for new or worsening symptoms. The treatment plan was discussed with the patient who demonstrated understanding and comfort with plan. The patient's history, physical exam, and plan of care was discussed with and agreed upon with my supervising physician.                                    Clinical Impression:     1. Pain, dental    2. Dental caries                ED Disposition Condition    Discharge Stable        ED Prescriptions     Medication Sig Dispense Start Date End Date Auth. Provider    penicillin v potassium (VEETID) 500 MG tablet Take 1 tablet (500 mg total) by mouth every 6 (six) hours. for 7 days 28 tablet 4/29/2020 5/6/2020 Corrine Pardo PA-C    naproxen (NAPROSYN) 500 MG tablet Take 1 tablet (500 mg total) by mouth 2 (two) times daily with  meals. 10 tablet 4/29/2020  Corrine Pardo PA-C        Follow-up Information     Follow up With Specialties Details Why Contact Info    your dentist  Schedule an appointment as soon as possible for a visit in 5 days      [facility] Emergency Medicine Go to  If symptoms worsen [address] [id number]  [phone]                                     Corrine Pardo PA-C  04/30/20 0033

## 2020-04-30 NOTE — ED TRIAGE NOTES
49 y M presents with c/o tooth pain since yesterday, and left facial swelling started today. Last seen by a dentist 6 months ago, had top and bottom teeth from the left removed, and one tooth on the left upper had broken off in the past, c/o left upper tooth pain. Denies fevers, body aches, at home, reports episode of chills prior to arrival. He took left over amoxicillin yesterday and today.

## 2020-04-30 NOTE — ED NOTES
Patient transferred to [facility] per private vechile  Instructions, imaging CD, and paper chart given to pt  Patient verbalizes understanding   Patient denies pain, chest pain and shortness of breath   All belongings sent home with patient   NAD at transfer  Pt wearing mask

## 2020-10-15 NOTE — PROGRESS NOTES
This note was created by combination of typed  and M-Modal dictation.  Transcription errors may be present.  If there are any questions, please contact me.    Assessment and Plan:   Normal physical exam  Encounter for screening for HIV  Need for hepatitis C screening test  Morbid obesity with BMI of 60.0-69.9, adult  -check fasting labs  Has not been as restricted in diet of late.  Hx of dyslipidemia  -     CBC auto differential; Future; Expected date: 10/16/2020  -     Comprehensive Metabolic Panel; Future; Expected date: 10/16/2020  -     Lipid Panel; Future; Expected date: 10/16/2020  -     Hemoglobin A1C; Future; Expected date: 10/16/2020  -     TSH; Future; Expected date: 10/16/2020  -     HIV 1/2 Ag/Ab (4th Gen); Future; Expected date: 10/16/2020  -     Hepatitis C Antibody; Future; Expected date: 10/16/2020    Chronic gout without tophus, unspecified cause, unspecified site  -recovering from a flare not 100%.  Hx of indocin.  Last creatinine wnl  Check uric acid. Refill indocin  Last flare he reports was years ago  Aware of purine diet  Monitor for now  Discussed could be worsened by lasix.  -     Uric Acid; Future; Expected date: 10/16/2020  -     indomethacin (INDOCIN) 50 MG capsule; Take 1 capsule (50 mg total) by mouth 3 (three) times daily with meals. for 7 days  Dispense: 21 capsule; Refill: 0    Obstructive sleep apnea  -CPAP supplies refilled.  -     CBC auto differential; Future; Expected date: 10/16/2020  -     CPAP/BIPAP SUPPLIES    Benign hypertension  -hx of lasix but no other meds.  He wants to work on TLC first.     Dyslipidemia  -check labs. Work on TLC.  -     Lipid Panel; Future; Expected date: 10/16/2020  -     TSH; Future; Expected date: 10/16/2020    Gastroesophageal reflux disease, unspecified whether esophagitis present  -refilled omeprazole.  -     omeprazole (PRILOSEC) 20 MG capsule; Take 1 capsule (20 mg total) by mouth daily as needed.  Dispense: 90 capsule; Refill:  3    Abnormal glucose  -work on TLC  -     Hemoglobin A1C; Future; Expected date: 10/16/2020    Venous insufficiency  -refilled lasix, advised him to wait until he completely resolves gout flare then can restart on previous dose.  -     furosemide (LASIX) 20 MG tablet; Take 1 tablet (20 mg total) by mouth once daily.  Dispense: 90 tablet; Refill: 0    Smoker since age 15  -precontemplative stage of quitting, declines referral to cessation class    Nail hypertrophy  -discussed differential dx including onychomycosis, trauma with discoloration, I would hold on antifungal treatment, if he is insistent in the future would consider nail fungal culture first.    Medications Discontinued During This Encounter   Medication Reason    naproxen (NAPROSYN) 500 MG tablet     omeprazole (PRILOSEC) 20 MG capsule Reorder       meds sent this encounter:  Medications Ordered This Encounter   Medications    furosemide (LASIX) 20 MG tablet     Sig: Take 1 tablet (20 mg total) by mouth once daily.     Dispense:  90 tablet     Refill:  0    indomethacin (INDOCIN) 50 MG capsule     Sig: Take 1 capsule (50 mg total) by mouth 3 (three) times daily with meals. for 7 days     Dispense:  21 capsule     Refill:  0    omeprazole (PRILOSEC) 20 MG capsule     Sig: Take 1 capsule (20 mg total) by mouth daily as needed.     Dispense:  90 capsule     Refill:  3       Follow Up: No follow-ups on file. f/u 3 months with PCP    Subjective:     Chief Complaint   Patient presents with    Establish Care    Gout     ankle, right       HPI  Jose Manuel is a 49 y.o. male, last appointment with this clinic was Visit date not found.    Last seen in this clinic greater than 3 years ago  Morbid obesity  Sleep apnea  Hyperlipidemia  Abnormal glucose/prediabetes  History of gout  GERD  Venous insufficiency with history of Lasix  Several ER visits for dental infections.  04/2020, 03/2019 04/2020 CBC normal  CMP normal    2016 lipid profile high   01/2016  A1c 6.4    Recent gout flare 2-3 days ago with pain still in the R ankle.   Fluid buildup in both ankles.   Had not had a flare in a few years  Had previously had success with diet changes - less meat; less canned vegetables and more vegetables.  But had steered away from that diet of late and thinks that that triggered the flare.     Felt like the fluid pills helped him with weight loss  Aside from lasix, no meds in the past for HTN.   Has been out of lasix and requesting RF.  Does not check BP outside of a doctor's office.   BP high on intake, better but still high on repeat.  He denies sx, no headache, chest pain, dyspnea.    Using CPAP machine for sleep apnea. Needs RF on supplies.     Smoker 1 PPD x age 15.  So about 30 years. percontemplative stage of quitting.    He declines flu shot today.    Thinks he has fungal infection on fthe nails.  Thickened and discolored.    Patient Care Team:  Emerson Houser MD as PCP - General (Family Medicine)  Siri Rojas MA as Care Coordinator    Patient Active Problem List    Diagnosis Date Noted    Onychomycosis 09/06/2017    Gastroesophageal reflux disease 09/06/2017    Obstructive sleep apnea 04/01/2016    Morbid obesity with BMI of 60.0-69.9, adult 07/17/2014    Benign hypertension 04/09/2014    Hyperlipidemia 04/09/2014    Osteoarthritis of hip 04/09/2014    Chronic gout 04/09/2014       PAST MEDICAL HISTORY:  Past Medical History:   Diagnosis Date    Gastroesophageal reflux disease 9/6/2017    Gout     Morbid obesity     Sleep apnea        PAST SURGICAL HISTORY:  Past Surgical History:   Procedure Laterality Date    HIP FRACTURE SURGERY      left hip fracture    WRIST SURGERY      left wrist surgery       SOCIAL HISTORY:  Social History     Socioeconomic History    Marital status: Single     Spouse name: Not on file    Number of children: Not on file    Years of education: Not on file    Highest education level: Not on file   Occupational History     Not on file   Social Needs    Financial resource strain: Not on file    Food insecurity     Worry: Not on file     Inability: Not on file    Transportation needs     Medical: Not on file     Non-medical: Not on file   Tobacco Use    Smoking status: Current Every Day Smoker     Packs/day: 0.50     Years: 30.00     Pack years: 15.00     Types: Cigarettes    Smokeless tobacco: Never Used   Substance and Sexual Activity    Alcohol use: No    Drug use: No    Sexual activity: Yes     Partners: Female   Lifestyle    Physical activity     Days per week: Not on file     Minutes per session: Not on file    Stress: Not on file   Relationships    Social connections     Talks on phone: Not on file     Gets together: Not on file     Attends Sabianist service: Not on file     Active member of club or organization: Not on file     Attends meetings of clubs or organizations: Not on file     Relationship status: Not on file   Other Topics Concern    Not on file   Social History Narrative    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 He occasionally uses a cane.        ALLERGIES AND MEDICATIONS: updated and reviewed.  Review of patient's allergies indicates:   Allergen Reactions    Tramadol      Nausea, abdominal cramps    Allopurinol analogues Other (See Comments)     syncope       Medication List with Changes/Refills   Current Medications    NAPROXEN (NAPROSYN) 500 MG TABLET    Take 1 tablet (500 mg total) by mouth 2 (two) times daily with meals.    OMEPRAZOLE (PRILOSEC) 20 MG CAPSULE    TAKE 1 TABLET BY MOUTH DAILY AS NEEDED.        ROS    Objective:   Physical Exam   Vitals:    10/16/20 0757 10/16/20 0817   BP: (!) 152/84 (!) 148/94   BP Location: Left arm Left arm   Patient Position: Sitting Sitting   BP Method: Large (Manual) Large (Manual)   Pulse: 71    Temp: 97.7 °F (36.5 °C)    TempSrc: Temporal    SpO2: 97%    Weight: (!) 161.4 kg (355 lb 13.2  "oz)    Height: 5' 10" (1.778 m)     Body mass index is 51.06 kg/m².            Physical Exam  Constitutional:       General: He is not in acute distress.     Appearance: He is well-developed.   Cardiovascular:      Rate and Rhythm: Normal rate and regular rhythm.      Heart sounds: Normal heart sounds. No murmur.   Pulmonary:      Effort: Pulmonary effort is normal.      Breath sounds: Normal breath sounds.   Musculoskeletal: Normal range of motion.      Right lower leg: Edema present.      Left lower leg: Edema present.   Skin:     General: Skin is warm and dry.      Comments: Great toenails thickened and discolored. Surrounding skin without erythema or skin break   Neurological:      Mental Status: He is alert and oriented to person, place, and time.      Coordination: Coordination normal.   Psychiatric:         Behavior: Behavior normal.         Thought Content: Thought content normal.         "

## 2020-10-16 ENCOUNTER — TELEPHONE (OUTPATIENT)
Dept: FAMILY MEDICINE | Facility: CLINIC | Age: 50
End: 2020-10-16

## 2020-10-16 ENCOUNTER — OFFICE VISIT (OUTPATIENT)
Dept: FAMILY MEDICINE | Facility: CLINIC | Age: 50
End: 2020-10-16
Payer: MEDICARE

## 2020-10-16 ENCOUNTER — LAB VISIT (OUTPATIENT)
Dept: LAB | Facility: HOSPITAL | Age: 50
End: 2020-10-16
Attending: INTERNAL MEDICINE
Payer: MEDICARE

## 2020-10-16 VITALS
DIASTOLIC BLOOD PRESSURE: 94 MMHG | OXYGEN SATURATION: 97 % | SYSTOLIC BLOOD PRESSURE: 148 MMHG | WEIGHT: 315 LBS | BODY MASS INDEX: 45.1 KG/M2 | HEART RATE: 71 BPM | TEMPERATURE: 98 F | HEIGHT: 70 IN

## 2020-10-16 DIAGNOSIS — Z11.4 ENCOUNTER FOR SCREENING FOR HIV: ICD-10-CM

## 2020-10-16 DIAGNOSIS — L60.2 NAIL HYPERTROPHY: ICD-10-CM

## 2020-10-16 DIAGNOSIS — M1A.9XX0 CHRONIC GOUT WITHOUT TOPHUS, UNSPECIFIED CAUSE, UNSPECIFIED SITE: ICD-10-CM

## 2020-10-16 DIAGNOSIS — Z00.00 NORMAL PHYSICAL EXAM: Primary | ICD-10-CM

## 2020-10-16 DIAGNOSIS — G47.33 OBSTRUCTIVE SLEEP APNEA: ICD-10-CM

## 2020-10-16 DIAGNOSIS — Z11.59 NEED FOR HEPATITIS C SCREENING TEST: ICD-10-CM

## 2020-10-16 DIAGNOSIS — E66.01 MORBID OBESITY WITH BMI OF 60.0-69.9, ADULT: ICD-10-CM

## 2020-10-16 DIAGNOSIS — K21.9 GASTROESOPHAGEAL REFLUX DISEASE, UNSPECIFIED WHETHER ESOPHAGITIS PRESENT: ICD-10-CM

## 2020-10-16 DIAGNOSIS — I10 BENIGN HYPERTENSION: ICD-10-CM

## 2020-10-16 DIAGNOSIS — R73.09 ABNORMAL GLUCOSE: ICD-10-CM

## 2020-10-16 DIAGNOSIS — I87.2 VENOUS INSUFFICIENCY: ICD-10-CM

## 2020-10-16 DIAGNOSIS — F17.200 SMOKER: ICD-10-CM

## 2020-10-16 DIAGNOSIS — E78.5 DYSLIPIDEMIA: ICD-10-CM

## 2020-10-16 DIAGNOSIS — Z00.00 NORMAL PHYSICAL EXAM: ICD-10-CM

## 2020-10-16 LAB
ALBUMIN SERPL BCP-MCNC: 3.5 G/DL (ref 3.5–5.2)
ALP SERPL-CCNC: 70 U/L (ref 55–135)
ALT SERPL W/O P-5'-P-CCNC: 20 U/L (ref 10–44)
ANION GAP SERPL CALC-SCNC: 8 MMOL/L (ref 8–16)
AST SERPL-CCNC: 15 U/L (ref 10–40)
BASOPHILS # BLD AUTO: 0.02 K/UL (ref 0–0.2)
BASOPHILS NFR BLD: 0.5 % (ref 0–1.9)
BILIRUB SERPL-MCNC: 0.3 MG/DL (ref 0.1–1)
BUN SERPL-MCNC: 12 MG/DL (ref 6–20)
CALCIUM SERPL-MCNC: 8.8 MG/DL (ref 8.7–10.5)
CHLORIDE SERPL-SCNC: 104 MMOL/L (ref 95–110)
CHOLEST SERPL-MCNC: 196 MG/DL (ref 120–199)
CHOLEST/HDLC SERPL: 7 {RATIO} (ref 2–5)
CO2 SERPL-SCNC: 28 MMOL/L (ref 23–29)
CREAT SERPL-MCNC: 1.1 MG/DL (ref 0.5–1.4)
DIFFERENTIAL METHOD: ABNORMAL
EOSINOPHIL # BLD AUTO: 0.2 K/UL (ref 0–0.5)
EOSINOPHIL NFR BLD: 5.7 % (ref 0–8)
ERYTHROCYTE [DISTWIDTH] IN BLOOD BY AUTOMATED COUNT: 15 % (ref 11.5–14.5)
EST. GFR  (AFRICAN AMERICAN): >60 ML/MIN/1.73 M^2
EST. GFR  (NON AFRICAN AMERICAN): >60 ML/MIN/1.73 M^2
ESTIMATED AVG GLUCOSE: 105 MG/DL (ref 68–131)
GLUCOSE SERPL-MCNC: 89 MG/DL (ref 70–110)
HBA1C MFR BLD HPLC: 5.3 % (ref 4–5.6)
HCT VFR BLD AUTO: 44.3 % (ref 40–54)
HDLC SERPL-MCNC: 28 MG/DL (ref 40–75)
HDLC SERPL: 14.3 % (ref 20–50)
HGB BLD-MCNC: 13.9 G/DL (ref 14–18)
IMM GRANULOCYTES # BLD AUTO: 0.01 K/UL (ref 0–0.04)
IMM GRANULOCYTES NFR BLD AUTO: 0.2 % (ref 0–0.5)
LDLC SERPL CALC-MCNC: 146 MG/DL (ref 63–159)
LYMPHOCYTES # BLD AUTO: 1.7 K/UL (ref 1–4.8)
LYMPHOCYTES NFR BLD: 40.9 % (ref 18–48)
MCH RBC QN AUTO: 27.4 PG (ref 27–31)
MCHC RBC AUTO-ENTMCNC: 31.4 G/DL (ref 32–36)
MCV RBC AUTO: 87 FL (ref 82–98)
MONOCYTES # BLD AUTO: 0.4 K/UL (ref 0.3–1)
MONOCYTES NFR BLD: 8.6 % (ref 4–15)
NEUTROPHILS # BLD AUTO: 1.9 K/UL (ref 1.8–7.7)
NEUTROPHILS NFR BLD: 44.1 % (ref 38–73)
NONHDLC SERPL-MCNC: 168 MG/DL
NRBC BLD-RTO: 0 /100 WBC
PLATELET # BLD AUTO: 308 K/UL (ref 150–350)
PMV BLD AUTO: 10.2 FL (ref 9.2–12.9)
POTASSIUM SERPL-SCNC: 4.2 MMOL/L (ref 3.5–5.1)
PROT SERPL-MCNC: 7.4 G/DL (ref 6–8.4)
RBC # BLD AUTO: 5.07 M/UL (ref 4.6–6.2)
SODIUM SERPL-SCNC: 140 MMOL/L (ref 136–145)
TRIGL SERPL-MCNC: 110 MG/DL (ref 30–150)
TSH SERPL DL<=0.005 MIU/L-ACNC: 1.45 UIU/ML (ref 0.4–4)
URATE SERPL-MCNC: 9.3 MG/DL (ref 3.4–7)
WBC # BLD AUTO: 4.21 K/UL (ref 3.9–12.7)

## 2020-10-16 PROCEDURE — 99499 RISK ADDL DX/OHS AUDIT: ICD-10-PCS | Mod: S$GLB,,, | Performed by: INTERNAL MEDICINE

## 2020-10-16 PROCEDURE — 86803 HEPATITIS C AB TEST: CPT

## 2020-10-16 PROCEDURE — 3078F PR MOST RECENT DIASTOLIC BLOOD PRESSURE < 80 MM HG: ICD-10-PCS | Mod: CPTII,S$GLB,, | Performed by: INTERNAL MEDICINE

## 2020-10-16 PROCEDURE — 3077F PR MOST RECENT SYSTOLIC BLOOD PRESSURE >= 140 MM HG: ICD-10-PCS | Mod: CPTII,S$GLB,, | Performed by: INTERNAL MEDICINE

## 2020-10-16 PROCEDURE — 99499 UNLISTED E&M SERVICE: CPT | Mod: ,,, | Performed by: INTERNAL MEDICINE

## 2020-10-16 PROCEDURE — 99204 OFFICE O/P NEW MOD 45 MIN: CPT | Mod: S$GLB,,, | Performed by: INTERNAL MEDICINE

## 2020-10-16 PROCEDURE — 3077F SYST BP >= 140 MM HG: CPT | Mod: CPTII,S$GLB,, | Performed by: INTERNAL MEDICINE

## 2020-10-16 PROCEDURE — 99999 PR PBB SHADOW E&M-EST. PATIENT-LVL III: ICD-10-PCS | Mod: PBBFAC,,, | Performed by: INTERNAL MEDICINE

## 2020-10-16 PROCEDURE — 99499 UNLISTED E&M SERVICE: CPT | Mod: S$GLB,,, | Performed by: INTERNAL MEDICINE

## 2020-10-16 PROCEDURE — 85025 COMPLETE CBC W/AUTO DIFF WBC: CPT

## 2020-10-16 PROCEDURE — 3008F BODY MASS INDEX DOCD: CPT | Mod: CPTII,S$GLB,, | Performed by: INTERNAL MEDICINE

## 2020-10-16 PROCEDURE — 99204 PR OFFICE/OUTPT VISIT, NEW, LEVL IV, 45-59 MIN: ICD-10-PCS | Mod: S$GLB,,, | Performed by: INTERNAL MEDICINE

## 2020-10-16 PROCEDURE — 99999 PR PBB SHADOW E&M-EST. PATIENT-LVL III: CPT | Mod: PBBFAC,,, | Performed by: INTERNAL MEDICINE

## 2020-10-16 PROCEDURE — 84550 ASSAY OF BLOOD/URIC ACID: CPT

## 2020-10-16 PROCEDURE — 80053 COMPREHEN METABOLIC PANEL: CPT

## 2020-10-16 PROCEDURE — 99499 RISK ADDL DX/OHS AUDIT: ICD-10-PCS | Mod: ,,, | Performed by: INTERNAL MEDICINE

## 2020-10-16 PROCEDURE — 86703 HIV-1/HIV-2 1 RESULT ANTBDY: CPT

## 2020-10-16 PROCEDURE — 80061 LIPID PANEL: CPT

## 2020-10-16 PROCEDURE — 3008F PR BODY MASS INDEX (BMI) DOCUMENTED: ICD-10-PCS | Mod: CPTII,S$GLB,, | Performed by: INTERNAL MEDICINE

## 2020-10-16 PROCEDURE — 84443 ASSAY THYROID STIM HORMONE: CPT

## 2020-10-16 PROCEDURE — 3078F DIAST BP <80 MM HG: CPT | Mod: CPTII,S$GLB,, | Performed by: INTERNAL MEDICINE

## 2020-10-16 PROCEDURE — 83036 HEMOGLOBIN GLYCOSYLATED A1C: CPT

## 2020-10-16 PROCEDURE — 36415 COLL VENOUS BLD VENIPUNCTURE: CPT | Mod: PO

## 2020-10-16 RX ORDER — INDOMETHACIN 50 MG/1
50 CAPSULE ORAL
Qty: 21 CAPSULE | Refills: 0 | Status: SHIPPED | OUTPATIENT
Start: 2020-10-16 | End: 2020-10-23

## 2020-10-16 RX ORDER — FUROSEMIDE 20 MG/1
20 TABLET ORAL DAILY
Qty: 90 TABLET | Refills: 0 | Status: SHIPPED | OUTPATIENT
Start: 2020-10-16 | End: 2021-01-07

## 2020-10-16 RX ORDER — OMEPRAZOLE 20 MG/1
20 CAPSULE, DELAYED RELEASE ORAL DAILY PRN
Qty: 90 CAPSULE | Refills: 3 | Status: SHIPPED | OUTPATIENT
Start: 2020-10-16 | End: 2021-10-16

## 2020-10-19 LAB
HCV AB SERPL QL IA: NEGATIVE
HIV 1+2 AB+HIV1 P24 AG SERPL QL IA: NEGATIVE

## 2020-10-20 DIAGNOSIS — D64.9 ANEMIA, UNSPECIFIED TYPE: Primary | ICD-10-CM

## 2020-10-20 NOTE — PROGRESS NOTES
Screening HIV and hep C negative  CBC slight anemia.  TSH normal  CMP normal  Dyslipidemia with low HDL non   Uric acid high had not been restricted in diet.  Not on preventative  A1c better 5.3 from previous 6.4.    Work on tlc  Consider statin  Would consider repeat CBC and ferritin and either fitkit or colonoscopy if abn

## 2020-10-20 NOTE — PROGRESS NOTES
Screening HIV and hep C negative  CBC slight anemia. Due to inflammation? Active gout at the time of OV.  TSH normal  CMP normal  Dyslipidemia with low HDL non   Uric acid high had not been restricted in diet.  Not on preventative  A1c better 5.3 from previous 6.4.     Spoke with pt - gout is improving.  Work on tlc  Has f/u with his PCP in 2 weeks. Repeat labs - CBC, ferritin and iron panel. Ordered.

## 2020-11-03 ENCOUNTER — LAB VISIT (OUTPATIENT)
Dept: LAB | Facility: HOSPITAL | Age: 50
End: 2020-11-03
Attending: INTERNAL MEDICINE
Payer: MEDICARE

## 2020-11-03 DIAGNOSIS — D64.9 ANEMIA, UNSPECIFIED TYPE: ICD-10-CM

## 2020-11-03 LAB
BASOPHILS # BLD AUTO: 0.04 K/UL (ref 0–0.2)
BASOPHILS NFR BLD: 0.7 % (ref 0–1.9)
DIFFERENTIAL METHOD: ABNORMAL
EOSINOPHIL # BLD AUTO: 0.2 K/UL (ref 0–0.5)
EOSINOPHIL NFR BLD: 2.8 % (ref 0–8)
ERYTHROCYTE [DISTWIDTH] IN BLOOD BY AUTOMATED COUNT: 15.8 % (ref 11.5–14.5)
FERRITIN SERPL-MCNC: 417 NG/ML (ref 20–300)
HCT VFR BLD AUTO: 47.3 % (ref 40–54)
HGB BLD-MCNC: 14.8 G/DL (ref 14–18)
IMM GRANULOCYTES # BLD AUTO: 0.03 K/UL (ref 0–0.04)
IMM GRANULOCYTES NFR BLD AUTO: 0.5 % (ref 0–0.5)
IRON SERPL-MCNC: 77 UG/DL (ref 45–160)
LYMPHOCYTES # BLD AUTO: 2.1 K/UL (ref 1–4.8)
LYMPHOCYTES NFR BLD: 35.1 % (ref 18–48)
MCH RBC QN AUTO: 27.2 PG (ref 27–31)
MCHC RBC AUTO-ENTMCNC: 31.3 G/DL (ref 32–36)
MCV RBC AUTO: 87 FL (ref 82–98)
MONOCYTES # BLD AUTO: 0.5 K/UL (ref 0.3–1)
MONOCYTES NFR BLD: 8.8 % (ref 4–15)
NEUTROPHILS # BLD AUTO: 3.1 K/UL (ref 1.8–7.7)
NEUTROPHILS NFR BLD: 52.1 % (ref 38–73)
NRBC BLD-RTO: 0 /100 WBC
PLATELET # BLD AUTO: 289 K/UL (ref 150–350)
PMV BLD AUTO: 10.4 FL (ref 9.2–12.9)
RBC # BLD AUTO: 5.45 M/UL (ref 4.6–6.2)
SATURATED IRON: 20 % (ref 20–50)
TOTAL IRON BINDING CAPACITY: 388 UG/DL (ref 250–450)
TRANSFERRIN SERPL-MCNC: 262 MG/DL (ref 200–375)
WBC # BLD AUTO: 5.99 K/UL (ref 3.9–12.7)

## 2020-11-03 PROCEDURE — 82728 ASSAY OF FERRITIN: CPT

## 2020-11-03 PROCEDURE — 36415 COLL VENOUS BLD VENIPUNCTURE: CPT | Mod: PO

## 2020-11-03 PROCEDURE — 83540 ASSAY OF IRON: CPT

## 2020-11-03 PROCEDURE — 85025 COMPLETE CBC W/AUTO DIFF WBC: CPT

## 2020-11-06 ENCOUNTER — OFFICE VISIT (OUTPATIENT)
Dept: FAMILY MEDICINE | Facility: CLINIC | Age: 50
End: 2020-11-06
Payer: MEDICARE

## 2020-11-06 VITALS
BODY MASS INDEX: 44.1 KG/M2 | HEIGHT: 71 IN | HEART RATE: 61 BPM | WEIGHT: 315 LBS | TEMPERATURE: 98 F | OXYGEN SATURATION: 98 % | DIASTOLIC BLOOD PRESSURE: 88 MMHG | SYSTOLIC BLOOD PRESSURE: 139 MMHG

## 2020-11-06 DIAGNOSIS — E78.5 DYSLIPIDEMIA: Chronic | ICD-10-CM

## 2020-11-06 DIAGNOSIS — I10 ESSENTIAL HYPERTENSION, BENIGN: Primary | ICD-10-CM

## 2020-11-06 DIAGNOSIS — Z72.0 TOBACCO ABUSE: ICD-10-CM

## 2020-11-06 PROBLEM — R73.09 ABNORMAL GLUCOSE: Status: RESOLVED | Noted: 2020-10-16 | Resolved: 2020-11-06

## 2020-11-06 PROCEDURE — 3079F PR MOST RECENT DIASTOLIC BLOOD PRESSURE 80-89 MM HG: ICD-10-PCS | Mod: CPTII,S$GLB,, | Performed by: FAMILY MEDICINE

## 2020-11-06 PROCEDURE — 3075F SYST BP GE 130 - 139MM HG: CPT | Mod: CPTII,S$GLB,, | Performed by: FAMILY MEDICINE

## 2020-11-06 PROCEDURE — 99214 OFFICE O/P EST MOD 30 MIN: CPT | Mod: S$GLB,,, | Performed by: FAMILY MEDICINE

## 2020-11-06 PROCEDURE — 99999 PR PBB SHADOW E&M-EST. PATIENT-LVL III: CPT | Mod: PBBFAC,,, | Performed by: FAMILY MEDICINE

## 2020-11-06 PROCEDURE — 99999 PR PBB SHADOW E&M-EST. PATIENT-LVL III: ICD-10-PCS | Mod: PBBFAC,,, | Performed by: FAMILY MEDICINE

## 2020-11-06 PROCEDURE — 99214 PR OFFICE/OUTPT VISIT, EST, LEVL IV, 30-39 MIN: ICD-10-PCS | Mod: S$GLB,,, | Performed by: FAMILY MEDICINE

## 2020-11-06 PROCEDURE — 3008F PR BODY MASS INDEX (BMI) DOCUMENTED: ICD-10-PCS | Mod: CPTII,S$GLB,, | Performed by: FAMILY MEDICINE

## 2020-11-06 PROCEDURE — 3079F DIAST BP 80-89 MM HG: CPT | Mod: CPTII,S$GLB,, | Performed by: FAMILY MEDICINE

## 2020-11-06 PROCEDURE — 3075F PR MOST RECENT SYSTOLIC BLOOD PRESS GE 130-139MM HG: ICD-10-PCS | Mod: CPTII,S$GLB,, | Performed by: FAMILY MEDICINE

## 2020-11-06 PROCEDURE — 3008F BODY MASS INDEX DOCD: CPT | Mod: CPTII,S$GLB,, | Performed by: FAMILY MEDICINE

## 2020-11-06 RX ORDER — LOSARTAN POTASSIUM 50 MG/1
50 TABLET ORAL DAILY
Qty: 90 TABLET | Refills: 3 | Status: SHIPPED | OUTPATIENT
Start: 2020-11-06 | End: 2021-12-30

## 2020-11-06 NOTE — PROGRESS NOTES
[facility]  Progress Note    SUBJECTIVE:     Chief Complaint   Patient presents with    chronic gout       HPI   Jose Manuel Jacome  is a 49 y.o. male here for follow-up of his chronic conditions. Has issues with gout which he is trying to manage with dietary changes. Doing well on current regimen. Patient has no other new complaints/problems at this time.      Review of patient's allergies indicates:   Allergen Reactions    Allopurinol analogues Other (See Comments)     syncope    Tramadol Nausea And Vomiting     Nausea, abdominal cramps       Past Medical History:   Diagnosis Date    Gastroesophageal reflux disease 9/6/2017    Gout     Morbid obesity     Sleep apnea      Past Surgical History:   Procedure Laterality Date    HIP FRACTURE SURGERY      left hip fracture    WRIST SURGERY      left wrist surgery     Family History   Problem Relation Age of Onset    Heart disease Mother     Cancer Father      Social History     Tobacco Use    Smoking status: Current Every Day Smoker     Packs/day: 1.00     Years: 30.00     Pack years: 30.00     Types: Cigarettes    Smokeless tobacco: Never Used   Substance Use Topics    Alcohol use: No    Drug use: No        Review of Systems   Constitutional: Negative for chills, fever and malaise/fatigue.   HENT: Negative.    Respiratory: Negative.  Negative for cough and shortness of breath.    Cardiovascular: Negative.  Negative for chest pain.   Gastrointestinal: Negative.  Negative for abdominal pain, nausea and vomiting.   Genitourinary: Negative.    Neurological: Negative for weakness and headaches.   All other systems reviewed and are negative.    OBJECTIVE:     Vitals:    11/06/20 0801   BP: 139/88   Pulse: 61   Temp: 98.3 °F (36.8 °C)     Body mass index is 50.46 kg/m².    Physical Exam   Constitutional: He is oriented to person, place, and time and well-developed, well-nourished, and in no distress. No distress.   HENT:   Head: Normocephalic and  atraumatic.   Nose: Nose normal.   Eyes: Conjunctivae and EOM are normal.   Cardiovascular: Normal rate, regular rhythm and normal heart sounds. Exam reveals no gallop and no friction rub.   No murmur heard.  Pulmonary/Chest: Effort normal and breath sounds normal. No respiratory distress. He has no wheezes. He has no rales. He exhibits no tenderness.   Abdominal: Soft. Bowel sounds are normal. He exhibits no distension. There is no abdominal tenderness. There is no rebound.   Neurological: He is alert and oriented to person, place, and time.   Skin: Skin is warm. He is not diaphoretic.       Old records were reviewed. Labs and/or images were independently reviewed.    ASSESSMENT     1. Essential hypertension, benign    2. Dyslipidemia    3. Tobacco abuse        PLAN:     Essential hypertension, benign  -     Start losartan (COZAAR) 50 MG tablet; Take 1 tablet (50 mg total) by mouth once daily.  Dispense: 90 tablet; Refill: 3  -     Educated patient to take medications as prescribed, and to record bp logs daily to bring along to next visit. Instructed patient to decrease salt intake. Also told patient to call if any new signs or symptoms develop.    Dyslipidemia   -     Counseled patient about healthy diet, exercise habits, and to increase physical activity.    Tobacco abuse  -     Ambulatory referral/consult to Smoking Cessation Program; Future; Expected date: 11/13/2020  -     Counseled patient about importance of smoking cessation. Patient ready to quit. Will start smoking cessation treatment options.      RTC CESAR Houser MD  11/06/2020 8:18 AM

## 2021-02-08 DIAGNOSIS — I87.2 VENOUS INSUFFICIENCY: ICD-10-CM

## 2021-02-10 RX ORDER — FUROSEMIDE 20 MG/1
TABLET ORAL
Qty: 90 TABLET | Refills: 2 | Status: SHIPPED | OUTPATIENT
Start: 2021-02-10

## 2021-10-01 ENCOUNTER — TELEPHONE (OUTPATIENT)
Dept: FAMILY MEDICINE | Facility: CLINIC | Age: 51
End: 2021-10-01

## 2021-12-31 DIAGNOSIS — I87.2 VENOUS INSUFFICIENCY: ICD-10-CM

## 2021-12-31 NOTE — TELEPHONE ENCOUNTER
Care Due:                  Date            Visit Type   Department     Provider  --------------------------------------------------------------------------------                                             [facility]                                           MED/ INTERNAL  Last Visit: 11-      None         MED/ PEDS      DEAN JOHNSON  Next Visit: None Scheduled  None         None Found                                                            Last  Test          Frequency    Reason                     Performed    Due Date  --------------------------------------------------------------------------------    Office Visit  12 months..  losartan.................  11- 11-    CMP.........  12 months..  losartan.................  Not Found    Overdue    Powered by [employer]. Reference number: [id number].   12/31/2021 12:13:22 AM CST

## 2022-01-03 RX ORDER — FUROSEMIDE 20 MG/1
TABLET ORAL
Qty: 90 TABLET | Refills: 2 | OUTPATIENT
Start: 2022-01-03

## 2022-01-28 DIAGNOSIS — I10 ESSENTIAL HYPERTENSION, BENIGN: ICD-10-CM

## 2022-01-28 RX ORDER — LOSARTAN POTASSIUM 50 MG/1
TABLET ORAL
Qty: 30 TABLET | Refills: 0 | Status: SHIPPED | OUTPATIENT
Start: 2022-01-28 | End: 2023-12-29

## 2022-01-28 NOTE — TELEPHONE ENCOUNTER
No new care gaps identified.  Powered by [employer]. Reference number: [id number].   1/28/2022 12:32:18 PM CST

## 2022-01-28 NOTE — TELEPHONE ENCOUNTER
rx sent x 1 last OV with Dr. Houser 1 year or greater. Have pt set up OV with Dr. Houser for follow up

## 2022-01-28 NOTE — TELEPHONE ENCOUNTER
Left message for pt to call the clinic back.  Pt need to schedule an appointment with provider refill was sent to pharmacy for one month.  Needs an office visit for future refills.  Please schedule

## 2022-03-16 DIAGNOSIS — I10 ESSENTIAL HYPERTENSION, BENIGN: ICD-10-CM

## 2022-04-12 ENCOUNTER — PATIENT OUTREACH (OUTPATIENT)
Dept: ADMINISTRATIVE | Facility: HOSPITAL | Age: 52
End: 2022-04-12
Payer: MEDICARE

## 2022-04-12 NOTE — PROGRESS NOTES
[facility] Scre[facility] gap report - no 2021 / 2022 results found, Left message for patient to call our office.    PCP visit needed - Left message for patient to call our office. Please schedule.

## 2022-04-13 DIAGNOSIS — Z12.11 COLON CANCER SCREENING: ICD-10-CM

## 2023-01-18 ENCOUNTER — PATIENT OUTREACH (OUTPATIENT)
Dept: ADMINISTRATIVE | Facility: HOSPITAL | Age: 53
End: 2023-01-18
Payer: MEDICARE

## 2023-12-29 ENCOUNTER — HOSPITAL ENCOUNTER (EMERGENCY)
Facility: HOSPITAL | Age: 53
Discharge: HOME | End: 2023-12-29
Attending: STUDENT IN AN ORGANIZED HEALTH CARE EDUCATION/TRAINING PROGRAM
Payer: MEDICARE

## 2023-12-29 VITALS
OXYGEN SATURATION: 98 % | WEIGHT: 315 LBS | DIASTOLIC BLOOD PRESSURE: 86 MMHG | HEIGHT: 71 IN | RESPIRATION RATE: 18 BRPM | TEMPERATURE: 99 F | HEART RATE: 66 BPM | SYSTOLIC BLOOD PRESSURE: 182 MMHG | BODY MASS INDEX: 44.1 KG/M2

## 2023-12-29 DIAGNOSIS — S93.401A SPRAIN OF RIGHT ANKLE, UNSPECIFIED LIGAMENT, INITIAL ENCOUNTER: Primary | ICD-10-CM

## 2023-12-29 DIAGNOSIS — R03.0 ELEVATED BLOOD PRESSURE READING: ICD-10-CM

## 2023-12-29 DIAGNOSIS — I10 ESSENTIAL HYPERTENSION, BENIGN: ICD-10-CM
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
## 2023-12-29 LAB — POCT GLUCOSE: 98 MG/DL (ref 70–110)

## 2023-12-29 PROCEDURE — 99284 EMERGENCY DEPT VISIT MOD MDM: CPT

## 2023-12-29 PROCEDURE — 25000003 PHARM REV CODE 250: Performed by: PHYSICIAN ASSISTANT

## 2023-12-29 PROCEDURE — 82962 GLUCOSE BLOOD TEST: CPT

## 2023-12-29 RX ORDER — IBUPROFEN 400 MG/1
800 TABLET ORAL
Status: COMPLETED | OUTPATIENT
Start: 2023-12-29 | End: 2023-12-29

## 2023-12-29 RX ORDER — IBUPROFEN 800 MG/1
800 TABLET ORAL EVERY 6 HOURS PRN
Qty: 12 TABLET | Refills: 0 | Status: SHIPPED | OUTPATIENT
Start: 2023-12-29

## 2023-12-29 RX ORDER — LOSARTAN POTASSIUM 50 MG/1
50 TABLET ORAL DAILY
Qty: 90 TABLET | Refills: 0 | Status: SHIPPED | OUTPATIENT
Start: 2023-12-29

## 2023-12-29 RX ORDER — ACETAMINOPHEN 325 MG/1
650 TABLET ORAL
Status: COMPLETED | OUTPATIENT
Start: 2023-12-29 | End: 2023-12-29

## 2023-12-29 RX ADMIN — ACETAMINOPHEN 650 MG: 325 TABLET ORAL at 11:12

## 2023-12-29 RX ADMIN — IBUPROFEN 800 MG: 400 TABLET ORAL at 11:12

## 2023-12-29 NOTE — DISCHARGE INSTRUCTIONS
Your x-rays do not show any broken bones. You likely sprained your ankle and contused your R 2nd toe. You left knee is not broken.   Take ibuprofen 800 mg every 6 hours as needed with food for anti-inflammatory relief.  You can take acetaminophen/tylenol 650 mg every 6 hours or 1000 mg every 8 hours for added relief.  Apply ice to the area for 10-20 minutes every 4 hours. You can apply heat 2 days after for the same duration and frequency.  Follow up with PCP.  Return to the ER for new or worsening symptoms.

## 2023-12-29 NOTE — Clinical Note
"Jose Manuel"James Jacome was seen and treated in our emergency department on 12/29/2023.  He may return to work on 01/01/2024.       If you have any questions or concerns, please don't hesitate to call.      Fanny Harper PA-C"

## 2023-12-29 NOTE — ED PROVIDER NOTES
Encounter Date: 12/29/2023       History     Chief Complaint   Patient presents with    Fall     Slipped in tub michele, hurt r ankle and L knee     10:49 AM  Patient is a 53-year-old male with a history of obesity, GERD, OLU who presents to [facility] ED for arthralgias status post fall.    Patient states on [place], last Saturday, proximally 6 days ago he fell while taking a shower.  States that he was reaching for something out of the tub and slipped.  He denies head trauma.  He caught himself with his right leg and had acute right ankle and foot pain 1st.  He believes he was favoring his left leg 2nd to pain started having left knee pain.  Currently he endorses 10/10 pain to his 2nd right toe, right ankle, and left knee.  He is still able to ambulate without assistance.  He did not take anything for pain this morning.  He did have some swelling to his right ankle, but reports improvement since onset.  Had lightheadedness and dizziness while in the wait room, but it completely resolved. Had coffee this AM.   Friend dropped off.     On chart review, last Cr 1.1.             Review of patient's allergies indicates:   Allergen Reactions    Allopurinol analogues Other (See Comments)     syncope    Tramadol Nausea And Vomiting     Nausea, abdominal cramps     Past Medical History:   Diagnosis Date    Gastroesophageal reflux disease 9/6/2017    Gout     Morbid obesity     Sleep apnea      Past Surgical History:   Procedure Laterality Date    HIP FRACTURE SURGERY      left hip fracture    WRIST SURGERY      left wrist surgery     Family History   Problem Relation Age of Onset    Heart disease Mother     Cancer Father      Social History     Tobacco Use    Smoking status: Every Day     Current packs/day: 1.00     Average packs/day: 1 pack/day for 30.0 years (30.0 ttl pk-yrs)     Types: Cigarettes    Smokeless tobacco: Never   Substance Use Topics    Alcohol use: No    Drug use: No     Review of Systems   Constitutional:   Negative for activity change, appetite change and fever.   HENT:  Negative for sore throat.    Eyes:  Negative for photophobia, pain and redness.   Respiratory:  Negative for shortness of breath.    Cardiovascular:  Negative for chest pain.   Gastrointestinal:  Negative for abdominal pain and nausea.   Genitourinary:  Negative for dysuria, frequency and hematuria.   Musculoskeletal:  Negative for back pain.   Skin:  Negative for rash.   Neurological:  Negative for dizziness (resolved), syncope, weakness, light-headedness (resolved) and headaches.   Hematological:  Does not bruise/bleed easily.       Physical Exam     Initial Vitals [12/29/23 0928]   BP Pulse Resp Temp SpO2   (!) 161/77 78 20 99.1 °F (37.3 °C) 100 %      MAP       --         Physical Exam    Vitals reviewed.  Constitutional: He appears well-developed and well-nourished. He is not diaphoretic. He is Obese . He is cooperative.  Non-toxic appearance. He does not have a sickly appearance. He does not appear ill. No distress.   HENT:   Head: Normocephalic and atraumatic.   Nose: Nose normal.   Mouth/Throat: No trismus in the jaw.   Eyes: Conjunctivae and EOM are normal.   Neck:   Normal range of motion.  Cardiovascular:  Normal rate.           Pulses:       Dorsalis pedis pulses are 2+ on the right side.   Pulmonary/Chest: No accessory muscle usage. No tachypnea. No respiratory distress.   Abdominal: He exhibits no distension.   Musculoskeletal:         General: Normal range of motion.      Cervical back: Normal range of motion.      Right knee: Normal.      Left knee: No swelling, deformity, effusion or bony tenderness. Normal range of motion. Tenderness present.      Right ankle: No swelling, deformity or ecchymosis. Tenderness present. Normal range of motion.      Left ankle: Normal.      Right foot: Normal range of motion. Tenderness and bony tenderness (R 2nd toe) present. No swelling or deformity.     Neurological: He is alert. He has normal  strength.   Skin: Skin is dry. No pallor.         ED Course   Procedures  Labs Reviewed   HIV 1 / 2 ANTIBODY   HEPATITIS C ANTIBODY   POCT GLUCOSE   POCT GLUCOSE MONITORING CONTINUOUS          Imaging Results              X-Ray Knee 3 View Left (Final result)  Result time 12/29/23 12:02:56      Final result by Yousuf Griggs MD (12/29/23 12:02:56)                   Impression:      See above      Electronically signed by: Yousuf Griggs MD  Date:    12/29/2023  Time:    12:02               Narrative:    EXAMINATION:  XR KNEE 3 VIEW LEFT    CLINICAL HISTORY:  Unspecified fall, initial encounter    TECHNIQUE:  AP, lateral, and Merchant views of the left knee were performed.    COMPARISON:  None    FINDINGS:  DJD with narrowing of the medial tibiofemoral joint space.  No acute fracture or dislocation.  No bone destruction identified.                                       X-Ray Ankle Complete Right (Final result)  Result time 12/29/23 12:06:41      Final result by Yousuf Griggs MD (12/29/23 12:06:41)                   Impression:      See above      Electronically signed by: Yousuf Griggs MD  Date:    12/29/2023  Time:    12:06               Narrative:    EXAMINATION:  XR ANKLE COMPLETE 3 VIEW RIGHT    CLINICAL HISTORY:  Unspecified fall, initial encounter    TECHNIQUE:  AP, lateral, and oblique images of the right ankle were performed.    COMPARISON:  None    FINDINGS:  DJD.  No acute fracture or dislocation.  No bone destruction identified.  Prominent talar beak noted.                                       X-Ray Toe 2 or More Views Right (Final result)  Result time 12/29/23 12:01:55      Final result by Yousuf Griggs MD (12/29/23 12:01:55)                   Impression:      See above      Electronically signed by: Yousuf Griggs MD  Date:    12/29/2023  Time:    12:01               Narrative:    EXAMINATION:  XR TOE 2 OR MORE VIEWS RIGHT    CLINICAL HISTORY:  R 2nd toe pain;    TECHNIQUE:  Three views of the right  toes were performed    COMPARISON:  None    FINDINGS:  No definite acute fracture or dislocation.  Mild DJD.                                       Medications   acetaminophen tablet 650 mg (650 mg Oral Given 12/29/23 1119)   ibuprofen tablet 800 mg (800 mg Oral Given 12/29/23 1108)     Medical Decision Making  Patient is a 53-year-old male with a history of obesity, GERD, OLU who presents to [facility] ED for arthralgias status post fall.    Differential diagnosis includes but is not limited to contusion of soft tissue and bone, strain, sprain, less likely fractures or dislocations given ability to range and bear weight without assistance.    Will give medication, apply ice, get x-rays, and reassess.    Amount and/or Complexity of Data Reviewed  Labs: ordered. Decision-making details documented in ED Course.  Radiology: ordered.    Risk  OTC drugs.  Prescription drug management.               ED Course as of 12/29/23 1250   Fri Dec 29, 2023   1054 BP(!): 161/77 [CL]   1054 Temp: 99.1 °F (37.3 °C) [CL]   1054 Pulse: 78 [CL]   1054 Resp: 20 [CL]   1054 SpO2: 100 % [CL]   1246 Right ankle, right toe, and left knee x-rays without acute processes.  Patient updated with results.  He likely sprained his right ankle and sprained/stubbed his right toe.  Started favoring his left leg which then caused his left knee pain.  Patient was sleeping upon reassessment.  Will continue treat him conservatively.  Continue ibuprofen.  His blood pressure is elevated but he is not endorsing any signs or symptoms of hypertensive emergency.  I have refilled his losartan for him.  We discussed lifestyle changes and daily physical activity to help lower his blood pressure and overall improve his well being.  Follow up with his PCP.  All of his questions were answered.  Patient comfortable with plan and stable for discharge. [CL]   1250 POCT Glucose: 98 [CL]      ED Course User Index  [CL] Fanny Harper PA-C                             Clinical  Impression:  Final diagnoses:  [W19.XXXA] Fall  [S93.401A] Sprain of right ankle, unspecified ligament, initial encounter (Primary)  [R03.0] Elevated blood pressure reading          ED Disposition Condition    Discharge Stable          ED Prescriptions       Medication Sig Dispense Start Date End Date Auth. Provider    losartan (COZAAR) 50 MG tablet Take 1 tablet (50 mg total) by mouth once daily. 90 tablet 12/29/2023 -- Fanny Harper PA-C    ibuprofen (ADVIL,MOTRIN) 800 MG tablet Take 1 tablet (800 mg total) by mouth every 6 (six) hours as needed for Pain. 12 tablet 12/29/2023 -- Fanny Harper PA-C Le, Catherine, PA-C  12/29/23 1251

## 2024-04-17 ENCOUNTER — TELEPHONE (OUTPATIENT)
Dept: SLEEP MEDICINE | Facility: CLINIC | Age: 54
End: 2024-04-17
Payer: MEDICARE

## 2024-04-28 NOTE — PROGRESS NOTES
ANNUAL VISIT NOTE     PRESENTING HISTORY     Reason for Visit:  Annual visit.    No chief complaint on file.    History of Present Illness & ROS: Mr. Jose Manuel Jacome is a 53 y.o. male.  Annual   New to me and practice site.   No est'd PCP and will RTC to establish with one accepting new patients at a later time.   Fasting for labs.   Reports that he no longer has a PCP         Review of Systems:  Eyes: denies visual changes at this time denies floaters   ENT: no nasal congestion or sore throat  Respiratory: no cough or shorness of breath  Cardiovascular: no chest pain or palpitations  Gastrointestinal: no nausea or vomiting, no abdominal pain or change in bowel habits  Genitourinary: no hematuria or dysuria; denies frequency  Hematologic/Lymphatic: no easy bruising or lymphadenopathy  Musculoskeletal: no arthralgias or myalgias  Neurological: no seizures or tremors  Endocrine: no heat or cold intolerance    PAST HISTORY:     Past Medical History:   Diagnosis Date    Gastroesophageal reflux disease 9/6/2017    Gout     Morbid obesity     Sleep apnea        Past Surgical History:   Procedure Laterality Date    HIP FRACTURE SURGERY      left hip fracture    WRIST SURGERY      left wrist surgery       Family History   Problem Relation Name Age of Onset    Heart disease Mother      Cancer Father         Social History     Socioeconomic History    Marital status: Single   Tobacco Use    Smoking status: Every Day     Current packs/day: 1.00     Average packs/day: 1 pack/day for 30.0 years (30.0 ttl pk-yrs)     Types: Cigarettes    Smokeless tobacco: Never   Substance and Sexual Activity    Alcohol use: No    Drug use: No    Sexual activity: Yes     Partners: Female   Social History Narrative    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 He occasionally uses a cane.        MEDICATIONS & ALLERGIES:     Current Outpatient Medications on File Prior to Visit    Medication Sig Dispense Refill    furosemide (LASIX) 20 MG tablet TAKE 1 TABLET BY MOUTH EVERY DAY 90 tablet 2    ibuprofen (ADVIL,MOTRIN) 800 MG tablet Take 1 tablet (800 mg total) by mouth every 6 (six) hours as needed for Pain. 12 tablet 0    losartan (COZAAR) 50 MG tablet Take 1 tablet (50 mg total) by mouth once daily. 90 tablet 0    omeprazole (PRILOSEC) 20 MG capsule Take 1 capsule (20 mg total) by mouth daily as needed. 90 capsule 3     No current facility-administered medications on file prior to visit.        Review of patient's allergies indicates:   Allergen Reactions    Allopurinol analogues Other (See Comments)     syncope    Tramadol Nausea And Vomiting     Nausea, abdominal cramps       Medications Reconciliation:   I have reconciled the patient's home medications and discharge medications with the patient/family. I have updated all changes.  Refer to After-Visit Medication List.    OBJECTIVE:     Vital Signs:  There were no vitals filed for this visit.  Wt Readings from Last 3 Encounters:   12/29/23 0928 (!) 161 kg (355 lb)   11/06/20 0801 (!) 161.8 kg (356 lb 11.3 oz)   10/16/20 0757 (!) 161.4 kg (355 lb 13.2 oz)     There is no height or weight on file to calculate BMI.       Physical Exam:  General: Well developed, well nourished. No distress.  HEENT: Head is normocephalic, atraumatic; ears are normal.   Eyes: Clear conjunctiva.  Neck: Supple, symmetrical neck; trachea midline.  Lungs: Clear to auscultation bilaterally and normal respiratory effort.  Cardiovascular: Heart with regular rate and rhythm. No murmurs, gallops or rubs  Extremities: No LE edema. Pulses 2+ and symmetric.   Abdomen: Abdomen is soft, non-tender non-distended with normal bowel sounds.  Skin: Skin color, texture, turgor normal. No rashes.  Musculoskeletal: Normal gait.   Lymph Nodes: No cervical or supraclavicular adenopathy.  Neurologic: Normal strength and tone. No focal numbness or weakness.   Psychiatric: Not  depressed.    Laboratory  Lab Results   Component Value Date    WBC 5.99 11/03/2020    HGB 14.8 11/03/2020    HCT 47.3 11/03/2020     11/03/2020    CHOL 196 10/16/2020    TRIG 110 10/16/2020    HDL 28 (L) 10/16/2020    ALT 20 10/16/2020    AST 15 10/16/2020     10/16/2020    K 4.2 10/16/2020     10/16/2020    CREATININE 1.1 10/16/2020    BUN 12 10/16/2020    CO2 28 10/16/2020    TSH 1.454 10/16/2020    PSA 0.37 10/04/2013    HGBA1C 5.3 10/16/2020       ASSESSMENT & PLAN:     Annual (Former PCP: Dr. TASHA Houser on WB)  PSA  C Scope    HTN:   Today:   ` Losartan     History of Nicotine Dependence:   ` LDCT Due...    GERD:   ` PPI    History of Gout:   ....    OLU:   ` CPAP    *Informed that will perform Annual PE today and will need to follow up with one of our  Physician Providers to be considered est'd in medical care with practice site.    Future Appointments   Date Time Provider Department Center   5/1/2024  9:00 AM Leanne Alberts FNP [facility]       After Visit Medication List :     Medication List            Accurate as of April 28, 2024  2:25 PM. If you have any questions, ask your nurse or doctor.                CONTINUE taking these medications      furosemide 20 MG tablet  Commonly known as: LASIX  TAKE 1 TABLET BY MOUTH EVERY DAY     ibuprofen 800 MG tablet  Commonly known as: ADVIL,MOTRIN  Take 1 tablet (800 mg total) by mouth every 6 (six) hours as needed for Pain.     losartan 50 MG tablet  Commonly known as: COZAAR  Take 1 tablet (50 mg total) by mouth once daily.     omeprazole 20 MG capsule  Commonly known as: PRILOSEC  Take 1 capsule (20 mg total) by mouth daily as needed.              Signing Physician:  HERLINDA Canales

## 2024-05-01 ENCOUNTER — OFFICE VISIT (OUTPATIENT)
Dept: INTERNAL MEDICINE | Facility: CLINIC | Age: 54
End: 2024-05-01
Payer: MEDICARE

## 2024-05-01 VITALS
WEIGHT: 315 LBS | HEIGHT: 71 IN | SYSTOLIC BLOOD PRESSURE: 162 MMHG | BODY MASS INDEX: 44.1 KG/M2 | HEART RATE: 54 BPM | OXYGEN SATURATION: 98 % | DIASTOLIC BLOOD PRESSURE: 100 MMHG

## 2024-05-01 VITALS
HEART RATE: 62 BPM | OXYGEN SATURATION: 98 % | WEIGHT: 315 LBS | HEIGHT: 71 IN | BODY MASS INDEX: 44.1 KG/M2 | SYSTOLIC BLOOD PRESSURE: 160 MMHG | DIASTOLIC BLOOD PRESSURE: 100 MMHG

## 2024-05-01 DIAGNOSIS — Z76.89 ENCOUNTER TO ESTABLISH CARE WITH NEW DOCTOR: ICD-10-CM

## 2024-05-01 DIAGNOSIS — E78.5 DYSLIPIDEMIA: Chronic | ICD-10-CM

## 2024-05-01 DIAGNOSIS — Z76.89 ENCOUNTER TO ESTABLISH CARE: Primary | ICD-10-CM

## 2024-05-01 DIAGNOSIS — Z13.6 ENCOUNTER FOR SCREENING FOR CARDIOVASCULAR DISORDERS: ICD-10-CM

## 2024-05-01 DIAGNOSIS — Z00.00 ANNUAL PHYSICAL EXAM: ICD-10-CM

## 2024-05-01 DIAGNOSIS — Z12.5 ENCOUNTER FOR SCREENING FOR MALIGNANT NEOPLASM OF PROSTATE: ICD-10-CM

## 2024-05-01 DIAGNOSIS — Z01.30 ENCOUNTER FOR BLOOD PRESSURE EXAMINATION: ICD-10-CM

## 2024-05-01 DIAGNOSIS — Z87.891 PERSONAL HISTORY OF NICOTINE DEPENDENCE: ICD-10-CM

## 2024-05-01 DIAGNOSIS — F17.200 SMOKER: ICD-10-CM

## 2024-05-01 DIAGNOSIS — E74.818 OTHER DISORDERS OF GLUCOSE TRANSPORT: ICD-10-CM

## 2024-05-01 DIAGNOSIS — R68.89 OTHER GENERAL SYMPTOMS AND SIGNS: ICD-10-CM

## 2024-05-01 DIAGNOSIS — Z12.11 ENCOUNTER FOR SCREENING FOR MALIGNANT NEOPLASM OF COLON: ICD-10-CM

## 2024-05-01 DIAGNOSIS — I10 ESSENTIAL HYPERTENSION, BENIGN: ICD-10-CM

## 2024-05-01 DIAGNOSIS — G47.33 OBSTRUCTIVE SLEEP APNEA: Chronic | ICD-10-CM

## 2024-05-01 DIAGNOSIS — G47.33 OSA (OBSTRUCTIVE SLEEP APNEA): Primary | ICD-10-CM

## 2024-05-01 DIAGNOSIS — I10 ESSENTIAL HYPERTENSION, BENIGN: Chronic | ICD-10-CM

## 2024-05-01 PROCEDURE — 99999 PR PBB SHADOW E&M-EST. PATIENT-LVL III: CPT | Mod: PBBFAC,,, | Performed by: NURSE PRACTITIONER

## 2024-05-01 PROCEDURE — 3080F DIAST BP >= 90 MM HG: CPT | Mod: CPTII,S$GLB,, | Performed by: FAMILY MEDICINE

## 2024-05-01 PROCEDURE — 3008F BODY MASS INDEX DOCD: CPT | Mod: CPTII,S$GLB,, | Performed by: FAMILY MEDICINE

## 2024-05-01 PROCEDURE — 99999 PR PBB SHADOW E&M-EST. PATIENT-LVL III: CPT | Mod: PBBFAC,,, | Performed by: FAMILY MEDICINE

## 2024-05-01 PROCEDURE — 1159F MED LIST DOCD IN RCRD: CPT | Mod: CPTII,S$GLB,, | Performed by: FAMILY MEDICINE

## 2024-05-01 PROCEDURE — 3077F SYST BP >= 140 MM HG: CPT | Mod: CPTII,S$GLB,, | Performed by: FAMILY MEDICINE

## 2024-05-01 PROCEDURE — 99203 OFFICE O/P NEW LOW 30 MIN: CPT | Mod: S$GLB,,, | Performed by: FAMILY MEDICINE

## 2024-05-01 RX ORDER — LOSARTAN POTASSIUM 50 MG/1
50 TABLET ORAL DAILY
Qty: 90 TABLET | Refills: 3 | Status: SHIPPED | OUTPATIENT
Start: 2024-05-01 | End: 2025-04-26

## 2024-05-01 NOTE — PROGRESS NOTES
"Subjective:     Patient ID: Jose Manuel Jacome is a 53 y.o. male.   Chief Complaint: Establish Care    HPI:  Patient presents to establish care.    Was incorrectly scheduled with another provider early this morning. Currently asking for DME supplies only. Needs updated CPAP rx.    Due for annual exam and labs.    BP is elevated, needs refill of losartan 50mg. Reports he has been mostly compliant with CPAP, though eh is low on supplies.    Review of Systems   Constitutional:  Negative for chills, fatigue and fever.   HENT:  Negative for nasal congestion, ear pain, postnasal drip and sore throat.    Eyes:  Negative for visual disturbance.   Respiratory:  Negative for cough, shortness of breath and wheezing.    Cardiovascular:  Negative for chest pain and palpitations.   Gastrointestinal:  Negative for abdominal pain, change in bowel habit, constipation, diarrhea, nausea and vomiting.   Genitourinary:  Negative for dysuria and hematuria.   Musculoskeletal:  Negative for back pain, leg pain and neck pain.   Neurological:  Negative for dizziness, numbness and headaches.   Hematological:  Negative for adenopathy.   Psychiatric/Behavioral:  Negative for dysphoric mood, sleep disturbance and suicidal ideas. The patient is not nervous/anxious.           Objective:      Vitals:    05/01/24 0958   BP: (!) 160/100   BP Location: Left arm   Patient Position: Sitting   BP Method: Medium (Manual)   Pulse: 62   SpO2: 98%   Weight: (!) 162.5 kg (358 lb 4 oz)   Height: 5' 11" (1.803 m)      Physical Exam  Vitals reviewed.   Constitutional:       General: He is not in acute distress.     Appearance: Normal appearance. He is obese. He is not ill-appearing.   HENT:      Mouth/Throat:      Mouth: Mucous membranes are dry.      Pharynx: Oropharynx is clear. No oropharyngeal exudate or posterior oropharyngeal erythema.   Cardiovascular:      Rate and Rhythm: Normal rate and regular rhythm.      Pulses: Normal pulses.      Heart sounds: Normal " heart sounds. No murmur heard.     No friction rub. No gallop.   Pulmonary:      Effort: Pulmonary effort is normal. No respiratory distress.      Breath sounds: Normal breath sounds. No stridor. No wheezing, rhonchi or rales.   Musculoskeletal:      Cervical back: Normal range of motion.   Neurological:      General: No focal deficit present.      Mental Status: He is alert and oriented to person, place, and time. Mental status is at baseline.   Psychiatric:         Mood and Affect: Mood normal.         Thought Content: Thought content normal.         Judgment: Judgment normal.           Assessment:       Problem List Items Addressed This Visit          Cardiac/Vascular    Essential hypertension, benign (Chronic)    Relevant Medications    losartan (COZAAR) 50 MG tablet     Other Visit Diagnoses       OLU (obstructive sleep apnea)    -  Primary    Relevant Orders    CPAP FOR HOME USE    Encounter to establish care with new doctor        Relevant Orders    Comprehensive Metabolic Panel    CBC Auto Differential    Lipid Panel    Hemoglobin A1C    TSH    T4, Free    PSA, Screening    Encounter for screening for malignant neoplasm of colon        Relevant Orders    Ambulatory referral/consult to Endo Procedure     Encounter for screening for malignant neoplasm of prostate        Relevant Orders    PSA, Screening    Encounter for blood pressure examination        Relevant Orders    Comprehensive Metabolic Panel    CBC Auto Differential    Encounter for screening for cardiovascular disorders        Relevant Orders    Lipid Panel    Other disorders of glucose transport        Relevant Orders    Hemoglobin A1C    Other general symptoms and signs        Relevant Orders    TSH    T4, Free    Personal history of nicotine dependence        Relevant Orders    CT Chest Lung Screening Low Dose              Plan:       1. OLU (obstructive sleep apnea)  Updating CPAP supplies rx  - CPAP FOR HOME USE    2. Encounter to  establish care with new doctor  Fasting labs ordered, pt to RTC for full annual exam in approx 4 weeks -- labs to be accomplished prior to this visit  - Comprehensive Metabolic Panel; Future  - CBC Auto Differential; Future  - Lipid Panel; Future  - Hemoglobin A1C; Future  - TSH; Future  - T4, Free; Future  - PSA, Screening; Future    3. Encounter for screening for malignant neoplasm of colon  - Ambulatory referral/consult to Endo Procedure ; Future    4. Encounter for screening for malignant neoplasm of prostate  - PSA, Screening; Future    5. Encounter for blood pressure examination  - Comprehensive Metabolic Panel; Future  - CBC Auto Differential; Future    6. Encounter for screening for cardiovascular disorders  - Lipid Panel; Future    7. Other disorders of glucose transport  - Hemoglobin A1C; Future    8. Other general symptoms and signs  - TSH; Future  - T4, Free; Future    9. Personal history of nicotine dependence  - CT Chest Lung Screening Low Dose; Future    10. Essential hypertension, benign  Poorly controlled, refill losartan; no s/sx emergency today  F/u at next visit, will make adjustments in meds if indicated  - losartan (COZAAR) 50 MG tablet; Take 1 tablet (50 mg total) by mouth once daily.  Dispense: 90 tablet; Refill: 3          Follow up in about 4 weeks (around 5/29/2024) for Annual Visit, Fasting labs.     Anatoliy Meier MD, FAAFP  Family Medicine Physician  [facility]  05/01/2024

## 2024-05-01 NOTE — PROGRESS NOTES
Jose Manuel is trying to establish medical care with a Physician accepting new patients in the practice. He has been scheduled incorrectly. He is wanting to remain at main campus location. Able to locate a PCP, in our practice, to see and est with at 10am today. Appreciate Dr. RUPINDER Meier.     *Apt for today. Cancelled with me.   NO DANN    ` SDJ

## 2024-05-06 ENCOUNTER — HOSPITAL ENCOUNTER (OUTPATIENT)
Dept: RADIOLOGY | Facility: HOSPITAL | Age: 54
Discharge: HOME | End: 2024-05-06
Attending: FAMILY MEDICINE
Payer: MEDICARE

## 2024-05-06 DIAGNOSIS — Z87.891 PERSONAL HISTORY OF NICOTINE DEPENDENCE: ICD-10-CM

## 2024-05-06 PROCEDURE — 71271 CT THORAX LUNG CANCER SCR C-: CPT | Mod: 26,,, | Performed by: STUDENT IN AN ORGANIZED HEALTH CARE EDUCATION/TRAINING PROGRAM

## 2024-05-06 PROCEDURE — 71271 CT THORAX LUNG CANCER SCR C-: CPT | Mod: TC

## 2024-05-10 ENCOUNTER — TELEPHONE (OUTPATIENT)
Dept: INTERNAL MEDICINE | Facility: CLINIC | Age: 54
End: 2024-05-10
Payer: MEDICARE

## 2024-05-10 DIAGNOSIS — E78.5 HYPERLIPIDEMIA, UNSPECIFIED HYPERLIPIDEMIA TYPE: Primary | ICD-10-CM

## 2024-05-10 RX ORDER — PRAVASTATIN SODIUM 20 MG/1
20 TABLET ORAL DAILY
Qty: 90 TABLET | Refills: 3 | Status: SHIPPED | OUTPATIENT
Start: 2024-05-10 | End: 2025-05-10

## 2024-05-10 NOTE — TELEPHONE ENCOUNTER
Please notify the patient of the following results.    Comprehensive Metabolic Panel (CMP)  Electrolytes (sodium, potassium, chloride, CO2, calcium, anion gap) are normal.   Glucose level is normal.   Kidney function tests (BUN, creatinine, eGFR) are normal.  Liver function tests (AST, ALT, Alk Phos) are normal.  Bilirubin, albumin, and total protein results are normal.    Complete Blood Count (CBC)  WBCs are normal.  Hemoglobin is normal.   Hematocrit is normal.  Platelets are normal.    Lipid Panel  Total cholesterol was abnormal (high). Values under 200 are considered optimal, this result was 239.  HDL/healthy cholesterol was abnormal (low) . Values over 40 are considered optimal, this result was 33.  LDL/unhealthy cholesterol was normal.  Triglycerides were abnormal (high).    The cholesterol levels are high enough that we need to start a medication to bring them down. I have sent a prescription for a medication called pravastatin (or Pravachol) to the pharmacy. It's a low dose so we may need to go up on the dose next time we check.  Be alert for persistent nausea, abdominal pain, or yellow jaundice, and report such to us. Be alert for pronounced persistent diffuse muscle pain and discontinue the drug immediately should such symptoms develop.     Hemoglobin A1c  A1c result normal. This indicates you do not have diabetes or prediabetes.    Thyroid Panel (TSH, Free T4)  TSH result is normal.   Free T4 result is normal.    PSA Screening  PSA result was normal.

## 2024-05-20 ENCOUNTER — PATIENT OUTREACH (OUTPATIENT)
Dept: ADMINISTRATIVE | Facility: HOSPITAL | Age: 54
End: 2024-05-20
Payer: MEDICARE

## 2024-05-20 NOTE — PROGRESS NOTES
Health Maintenance Due   Topic Date Due    Colorectal Cancer Screening  Never done    Shingles Vaccine (1 of 2) Never done    COVID-19 Vaccine (1 - 2023-24 season) Never done       Chart reviewed and updated.  Marissa Henry LPN   Clinical Care Coordinator  [facility]

## 2024-09-26 ENCOUNTER — TELEPHONE (OUTPATIENT)
Dept: SLEEP MEDICINE | Facility: CLINIC | Age: 54
End: 2024-09-26
Payer: MEDICARE

## 2024-09-26 NOTE — TELEPHONE ENCOUNTER
----- Message from Sisi Mcclelland sent at 9/26/2024  1:06 PM CDT -----  Regarding: appt for supplies  Type:  Patient Returning Call      Name of who is calling:pt         What is request in detail:pt is requesting a call back in regards to setting an appt to be seen for RX for Cpap supplies, but nothing is available on line for you and next NP visit isn't until December.        Can clinic reply by [facility]:no        What number to call back if not in [facility]: [phone]

## 2025-05-14 DIAGNOSIS — I10 ESSENTIAL HYPERTENSION, BENIGN: ICD-10-CM

## 2025-06-25 DIAGNOSIS — Z87.891 HISTORY OF TOBACCO USE: Primary | ICD-10-CM

## 2025-06-27 ENCOUNTER — TELEPHONE (OUTPATIENT)
Dept: PULMONOLOGY | Facility: CLINIC | Age: 55
End: 2025-06-27
Payer: MEDICARE